# Patient Record
Sex: MALE | Race: WHITE | Employment: OTHER | URBAN - NONMETROPOLITAN AREA
[De-identification: names, ages, dates, MRNs, and addresses within clinical notes are randomized per-mention and may not be internally consistent; named-entity substitution may affect disease eponyms.]

---

## 2017-05-30 ENCOUNTER — HOSPITAL ENCOUNTER (OUTPATIENT)
Dept: CT IMAGING | Facility: MEDICAL CENTER | Age: 82
Discharge: HOME | End: 2017-05-30
Attending: INTERNAL MEDICINE | Admitting: INTERNAL MEDICINE

## 2017-05-30 DIAGNOSIS — Z85.820 HISTORY OF MELANOMA: ICD-10-CM

## 2017-05-30 DIAGNOSIS — Z85.820 PERSONAL HISTORY OF MALIGNANT MELANOMA OF SKIN: ICD-10-CM

## 2017-05-30 LAB
A/G RATIO: 1.5 (ref 1.1–2.2)
ALBUMIN SERPL-MCNC: 3.7 G/DL (ref 3.4–5)
ALP BLD-CCNC: 120 U/L (ref 40–129)
ALT SERPL-CCNC: 10 U/L (ref 10–40)
ANION GAP SERPL CALCULATED.3IONS-SCNC: 11 MMOL/L (ref 3–16)
AST SERPL-CCNC: 16 U/L (ref 15–37)
BILIRUB SERPL-MCNC: 0.4 MG/DL (ref 0–1)
BUN BLDV-MCNC: 22 MG/DL (ref 7–20)
CALCIUM SERPL-MCNC: 9.3 MG/DL (ref 8.3–10.6)
CHLORIDE BLD-SCNC: 99 MMOL/L (ref 99–110)
CO2: 28 MMOL/L (ref 21–32)
CREAT SERPL-MCNC: 1 MG/DL (ref 0.8–1.3)
GFR AFRICAN AMERICAN: >60
GFR NON-AFRICAN AMERICAN: >60
GLOBULIN: 2.4 G/DL
GLUCOSE BLD-MCNC: 105 MG/DL (ref 70–99)
POTASSIUM SERPL-SCNC: 4.5 MMOL/L (ref 3.5–5.1)
SODIUM BLD-SCNC: 138 MMOL/L (ref 136–145)
TOTAL PROTEIN: 6.1 G/DL (ref 6.4–8.2)

## 2017-12-27 PROBLEM — I50.43 CHF (CONGESTIVE HEART FAILURE), NYHA CLASS I, ACUTE ON CHRONIC, COMBINED (HCC): Status: ACTIVE | Noted: 2017-12-27

## 2018-03-02 PROBLEM — J44.1 COPD EXACERBATION (HCC): Status: ACTIVE | Noted: 2018-03-02

## 2018-03-02 PROBLEM — J20.9 ACUTE BRONCHITIS: Status: ACTIVE | Noted: 2018-03-02

## 2018-03-03 PROBLEM — E44.0 MODERATE PROTEIN-CALORIE MALNUTRITION (HCC): Status: ACTIVE | Noted: 2018-03-03

## 2018-12-03 ENCOUNTER — TELEPHONE (OUTPATIENT)
Dept: PULMONOLOGY | Age: 83
End: 2018-12-03

## 2018-12-03 DIAGNOSIS — J44.9 CHRONIC OBSTRUCTIVE PULMONARY DISEASE, UNSPECIFIED COPD TYPE (HCC): Primary | ICD-10-CM

## 2019-02-07 ENCOUNTER — OFFICE VISIT (OUTPATIENT)
Dept: PULMONOLOGY | Age: 84
End: 2019-02-07
Payer: MEDICARE

## 2019-02-07 ENCOUNTER — HOSPITAL ENCOUNTER (OUTPATIENT)
Dept: PULMONOLOGY | Age: 84
Discharge: HOME | End: 2019-02-07
Payer: MEDICARE

## 2019-02-07 VITALS
WEIGHT: 162 LBS | RESPIRATION RATE: 20 BRPM | BODY MASS INDEX: 26.03 KG/M2 | HEART RATE: 78 BPM | TEMPERATURE: 97.7 F | OXYGEN SATURATION: 92 % | SYSTOLIC BLOOD PRESSURE: 110 MMHG | DIASTOLIC BLOOD PRESSURE: 70 MMHG | HEIGHT: 66 IN

## 2019-02-07 DIAGNOSIS — I50.43 CHF (CONGESTIVE HEART FAILURE), NYHA CLASS I, ACUTE ON CHRONIC, COMBINED (HCC): ICD-10-CM

## 2019-02-07 DIAGNOSIS — J44.9 COPD, MODERATE (HCC): Primary | ICD-10-CM

## 2019-02-07 DIAGNOSIS — R91.1 PULMONARY NODULE: ICD-10-CM

## 2019-02-07 PROCEDURE — 4040F PNEUMOC VAC/ADMIN/RCVD: CPT | Performed by: INTERNAL MEDICINE

## 2019-02-07 PROCEDURE — 94060 EVALUATION OF WHEEZING: CPT

## 2019-02-07 PROCEDURE — 4004F PT TOBACCO SCREEN RCVD TLK: CPT | Performed by: INTERNAL MEDICINE

## 2019-02-07 PROCEDURE — 94664 DEMO&/EVAL PT USE INHALER: CPT

## 2019-02-07 PROCEDURE — G8926 SPIRO NO PERF OR DOC: HCPCS | Performed by: INTERNAL MEDICINE

## 2019-02-07 PROCEDURE — 1123F ACP DISCUSS/DSCN MKR DOCD: CPT | Performed by: INTERNAL MEDICINE

## 2019-02-07 PROCEDURE — G8484 FLU IMMUNIZE NO ADMIN: HCPCS | Performed by: INTERNAL MEDICINE

## 2019-02-07 PROCEDURE — 6360000002 HC RX W HCPCS: Performed by: INTERNAL MEDICINE

## 2019-02-07 PROCEDURE — G8419 CALC BMI OUT NRM PARAM NOF/U: HCPCS | Performed by: INTERNAL MEDICINE

## 2019-02-07 PROCEDURE — 94640 AIRWAY INHALATION TREATMENT: CPT

## 2019-02-07 PROCEDURE — 94729 DIFFUSING CAPACITY: CPT

## 2019-02-07 PROCEDURE — G8427 DOCREV CUR MEDS BY ELIG CLIN: HCPCS | Performed by: INTERNAL MEDICINE

## 2019-02-07 PROCEDURE — 94726 PLETHYSMOGRAPHY LUNG VOLUMES: CPT

## 2019-02-07 PROCEDURE — 1101F PT FALLS ASSESS-DOCD LE1/YR: CPT | Performed by: INTERNAL MEDICINE

## 2019-02-07 PROCEDURE — 99205 OFFICE O/P NEW HI 60 MIN: CPT | Performed by: INTERNAL MEDICINE

## 2019-02-07 PROCEDURE — 3023F SPIROM DOC REV: CPT | Performed by: INTERNAL MEDICINE

## 2019-02-07 PROCEDURE — 94618 PULMONARY STRESS TESTING: CPT

## 2019-02-07 RX ORDER — ALBUTEROL SULFATE 2.5 MG/3ML
2.5 SOLUTION RESPIRATORY (INHALATION) ONCE
Status: COMPLETED | OUTPATIENT
Start: 2019-02-07 | End: 2019-02-07

## 2019-02-07 RX ADMIN — ALBUTEROL SULFATE 2.5 MG: 2.5 SOLUTION RESPIRATORY (INHALATION) at 11:15

## 2019-05-14 ENCOUNTER — OFFICE VISIT (OUTPATIENT)
Dept: PULMONOLOGY | Age: 84
End: 2019-05-14
Payer: MEDICARE

## 2019-05-14 VITALS
OXYGEN SATURATION: 96 % | HEIGHT: 66 IN | WEIGHT: 167.8 LBS | TEMPERATURE: 97.4 F | BODY MASS INDEX: 26.97 KG/M2 | SYSTOLIC BLOOD PRESSURE: 106 MMHG | HEART RATE: 63 BPM | DIASTOLIC BLOOD PRESSURE: 63 MMHG | RESPIRATION RATE: 16 BRPM

## 2019-05-14 DIAGNOSIS — J44.9 COPD, MODERATE (HCC): Primary | ICD-10-CM

## 2019-05-14 DIAGNOSIS — Z72.0 TOBACCO ABUSE: ICD-10-CM

## 2019-05-14 PROCEDURE — 4040F PNEUMOC VAC/ADMIN/RCVD: CPT | Performed by: INTERNAL MEDICINE

## 2019-05-14 PROCEDURE — G8419 CALC BMI OUT NRM PARAM NOF/U: HCPCS | Performed by: INTERNAL MEDICINE

## 2019-05-14 PROCEDURE — 1123F ACP DISCUSS/DSCN MKR DOCD: CPT | Performed by: INTERNAL MEDICINE

## 2019-05-14 PROCEDURE — G8427 DOCREV CUR MEDS BY ELIG CLIN: HCPCS | Performed by: INTERNAL MEDICINE

## 2019-05-14 PROCEDURE — 99213 OFFICE O/P EST LOW 20 MIN: CPT | Performed by: INTERNAL MEDICINE

## 2019-05-14 PROCEDURE — 4004F PT TOBACCO SCREEN RCVD TLK: CPT | Performed by: INTERNAL MEDICINE

## 2019-05-14 PROCEDURE — 3023F SPIROM DOC REV: CPT | Performed by: INTERNAL MEDICINE

## 2019-05-14 PROCEDURE — G8926 SPIRO NO PERF OR DOC: HCPCS | Performed by: INTERNAL MEDICINE

## 2019-05-14 RX ORDER — ALBUTEROL SULFATE 90 UG/1
2 AEROSOL, METERED RESPIRATORY (INHALATION) EVERY 6 HOURS PRN
COMMUNITY
End: 2020-01-01

## 2019-05-14 NOTE — PROGRESS NOTES
[facility]    Outpatient Follow Up Note    Chief Complaint: COPD  Consulting provider: Violeta Rodriguez PA-C    Interval History: 81 y.o. male Using Spiriva which has helped. Uses his rescue inhaler about 2x/day. He wheezes intermittently. Cough with white sputum. Both worse in the am    Initial HPI:Patient is not aware of having a lung nodule. The patient does not have any record of a lung nodule that is available today. He did have a clear chest x-ray November. The patient has a history of COPD and is a smoker. The patient does not have SOB at rest but has significant MCLEOD. Exercise tolerance on level ground is household or 1 block. The patient denies a cough. The patient does wheeze intermittently.     The patient had smoked 2 PPD for many years. Started as a teenager.  Cut back to 1/4 some years ago      Current Outpatient Medications:     albuterol sulfate HFA (VENTOLIN HFA) 108 (90 Base) MCG/ACT inhaler, Inhale 2 puffs into the lungs every 6 hours as needed for Wheezing, Disp: , Rfl:     Umeclidinium Bromide 62.5 MCG/INH AEPB, 1 inhalation daily, Disp: 1 each, Rfl: 5    buPROPion (WELLBUTRIN XL) 150 MG extended release tablet, Take 1 tablet by mouth every morning, Disp: 30 tablet, Rfl: 0    ALPRAZolam (XANAX) 0.25 MG tablet, Take 0.25 mg by mouth daily as needed for Sleep ., Disp: , Rfl:     polyethylene glycol (GLYCOLAX) powder, Take 17 g by mouth daily, Disp: , Rfl:     isosorbide mononitrate (IMDUR) 30 MG extended release tablet, , Disp: , Rfl:     Aspirin Buf,YiRql-ZcCrv-HbIex, (BUFFERED ASPIRIN) 325 MG TABS, Take 325 mg by mouth, Disp: , Rfl:     losartan (COZAAR) 50 MG tablet, Take 100 mg by mouth daily , Disp: , Rfl:     tamsulosin (FLOMAX) 0.4 MG capsule, Take 0.4 mg by mouth daily , Disp: , Rfl:     carvedilol (COREG) 6.25 MG tablet, Take 6.25 mg by mouth nightly , Disp: , Rfl:     gabapentin (NEURONTIN) 400 MG capsule, 3 times daily , Disp: , Rfl:    busPIRone (BUSPAR) 10 MG tablet, Take 15 mg by mouth 2 times daily , Disp: , Rfl:     spironolactone-hydrochlorothiazide (ALDACTAZIDE) 25-25 MG per tablet, Take 0.5 tablets by mouth daily, Disp: , Rfl:     omeprazole (PRILOSEC) 20 MG capsule, Take 20 mg by mouth daily, Disp: , Rfl:     amiodarone (CORDARONE) 200 MG tablet, Take 100 mg by mouth daily, Disp: , Rfl:     magnesium oxide (MAG-OX) 400 MG tablet, Take 400 mg by mouth daily, Disp: , Rfl:     Objective:   PHYSICAL EXAM: /63   Pulse 63   Temp 97.4 °F (36.3 °C) (Oral)   Resp 16   Ht 5' 6\" (1.676 m)   Wt 167 lb 12.8 oz (76.1 kg)   SpO2 96% Comment: RA  BMI 27.08 kg/m²    Constitutional:  No acute distress. Eyes: PERRL. Conjunctivae anicteric. ENT: Normal nose. Normal tongue. Oropharynx clear. Neck:  Trachea is midline. No thyroid tenderness. Respiratory: No accessory muscle usage. No wheezes. No rales. No Rhonchi. Cardiovascular: Normal S1S2. No digit clubbing. No digit cyanosis. No LE edema. Psychiatric: No anxiety or Agitation. Alert and Oriented to person, place and time. LABS:  Reviewed any pertinent new labs that are available.     PFTs 2/7/19  FVC  (%) FEV1  (60%) FEV1/FVC ratio 0.60   TLC  (142%)  RV  (228%)   DLCO (70%) Bronchodilator response: No     6MWT:       IMAGING:  I personally reviewed and interpreted the following imaging today in the office:   CXR: 11/17/18 at [facility]: hyperinflation, clear lungs   CXR 2/7/19 [facility]: clear lungs    ASSESSMENT:  · Moderate COPD  · Tobacco abuse  · CHF     PLAN:   ·  Incruse  · Continue PRN albuterol  · Pulm rehab declined due to distance  · Tobacco cessation discussed - has dec to 1/5 PPD

## 2019-05-14 NOTE — LETTER
[facility], Critical Care, and Sleep  [address], [address] [place] [address]  Phone: [phone]  Fax: [phone]    Hiwot Casey MD        May 14, 2019     Patient: Mariola Betancourt   YOB: 1935   Date of Visit: 5/14/2019       To Whom it May Concern:    Elli Barrett was seen in my clinic on 5/14/2019. If you have any questions or concerns, please don't hesitate to call.     Sincerely,         Hiwot Casey MD

## 2019-12-16 ENCOUNTER — TELEPHONE (OUTPATIENT)
Dept: PULMONOLOGY | Age: 84
End: 2019-12-16

## 2019-12-16 NOTE — TELEPHONE ENCOUNTER
Dr Sunny Marshall [phone] called from IP unit at [facility]. Spoke with Dr Jolly Rachel. Pt has pulm nodules concerning for cancer. Pt is being d/c today. Pt is scheduled for PET CT 12/17/19. Pt will need f/u this week with Dr Dina Rivera.

## 2019-12-31 NOTE — TELEPHONE ENCOUNTER
Called spoke with Beatriz Kramer his friend on [facility]IP[facility] states he was discharged 12/24/19 from [facility] is suzanne for appt for [facility] oncology 1/13/20 and Josh Blocker 1/28/20. All testing is in careeverywhere.

## 2020-01-01 ENCOUNTER — APPOINTMENT (OUTPATIENT)
Dept: GENERAL RADIOLOGY | Age: 85
End: 2020-01-01
Payer: MEDICARE

## 2020-01-01 ENCOUNTER — APPOINTMENT (OUTPATIENT)
Dept: CT IMAGING | Age: 85
End: 2020-01-01
Payer: MEDICARE

## 2020-01-01 ENCOUNTER — HOSPITAL ENCOUNTER (EMERGENCY)
Age: 85
Discharge: ANOTHER HOSPITAL | End: 2020-06-26
Attending: EMERGENCY MEDICINE
Payer: MEDICARE

## 2020-01-01 ENCOUNTER — TELEPHONE (OUTPATIENT)
Dept: PULMONOLOGY | Age: 85
End: 2020-01-01

## 2020-01-01 ENCOUNTER — HOSPITAL ENCOUNTER (EMERGENCY)
Age: 85
Discharge: HOME | End: 2020-11-14
Attending: EMERGENCY MEDICINE
Payer: MEDICARE

## 2020-01-01 ENCOUNTER — CARE COORDINATION (OUTPATIENT)
Dept: CARE COORDINATION | Age: 85
End: 2020-01-01

## 2020-01-01 ENCOUNTER — HOSPITAL ENCOUNTER (EMERGENCY)
Age: 85
Discharge: HOME | End: 2020-09-30
Attending: EMERGENCY MEDICINE
Payer: MEDICARE

## 2020-01-01 ENCOUNTER — HOSPITAL ENCOUNTER (OUTPATIENT)
Dept: CT IMAGING | Age: 85
Discharge: HOME | End: 2020-07-07
Payer: MEDICARE

## 2020-01-01 ENCOUNTER — HOSPITAL ENCOUNTER (EMERGENCY)
Age: 85
Discharge: HOME | End: 2020-11-30
Attending: EMERGENCY MEDICINE
Payer: MEDICARE

## 2020-01-01 ENCOUNTER — VIRTUAL VISIT (OUTPATIENT)
Dept: PULMONOLOGY | Age: 85
End: 2020-01-01
Payer: MEDICARE

## 2020-01-01 ENCOUNTER — HOSPITAL ENCOUNTER (EMERGENCY)
Age: 85
Discharge: HOME | End: 2020-05-12
Payer: MEDICARE

## 2020-01-01 VITALS
SYSTOLIC BLOOD PRESSURE: 145 MMHG | HEART RATE: 84 BPM | OXYGEN SATURATION: 96 % | BODY MASS INDEX: 26.52 KG/M2 | HEIGHT: 66 IN | RESPIRATION RATE: 18 BRPM | WEIGHT: 165 LBS | DIASTOLIC BLOOD PRESSURE: 72 MMHG | TEMPERATURE: 98.4 F

## 2020-01-01 VITALS
DIASTOLIC BLOOD PRESSURE: 80 MMHG | SYSTOLIC BLOOD PRESSURE: 160 MMHG | WEIGHT: 165 LBS | TEMPERATURE: 98.6 F | HEART RATE: 72 BPM | RESPIRATION RATE: 16 BRPM | BODY MASS INDEX: 26.52 KG/M2 | HEIGHT: 66 IN | OXYGEN SATURATION: 98 %

## 2020-01-01 VITALS
OXYGEN SATURATION: 93 % | TEMPERATURE: 98 F | HEART RATE: 75 BPM | DIASTOLIC BLOOD PRESSURE: 91 MMHG | RESPIRATION RATE: 18 BRPM | SYSTOLIC BLOOD PRESSURE: 177 MMHG

## 2020-01-01 VITALS
HEIGHT: 66 IN | OXYGEN SATURATION: 99 % | HEART RATE: 73 BPM | BODY MASS INDEX: 26.68 KG/M2 | RESPIRATION RATE: 16 BRPM | WEIGHT: 166 LBS | TEMPERATURE: 97.8 F | SYSTOLIC BLOOD PRESSURE: 149 MMHG | DIASTOLIC BLOOD PRESSURE: 83 MMHG

## 2020-01-01 VITALS
WEIGHT: 166 LBS | HEART RATE: 63 BPM | OXYGEN SATURATION: 100 % | BODY MASS INDEX: 26.79 KG/M2 | SYSTOLIC BLOOD PRESSURE: 172 MMHG | TEMPERATURE: 98.7 F | RESPIRATION RATE: 21 BRPM | DIASTOLIC BLOOD PRESSURE: 84 MMHG

## 2020-01-01 LAB
A/G RATIO: 1.2 (ref 1.1–2.2)
A/G RATIO: 1.2 (ref 1.1–2.2)
A/G RATIO: 1.3 (ref 1.1–2.2)
A/G RATIO: 1.5 (ref 1.1–2.2)
ALBUMIN SERPL-MCNC: 2.9 G/DL (ref 3.4–5)
ALBUMIN SERPL-MCNC: 3 G/DL (ref 3.4–5)
ALBUMIN SERPL-MCNC: 3.1 G/DL (ref 3.4–5)
ALBUMIN SERPL-MCNC: 3.6 G/DL (ref 3.4–5)
ALP BLD-CCNC: 140 U/L (ref 40–129)
ALP BLD-CCNC: 142 U/L (ref 40–129)
ALP BLD-CCNC: 155 U/L (ref 40–129)
ALP BLD-CCNC: 92 U/L (ref 40–129)
ALT SERPL-CCNC: 7 U/L (ref 10–40)
ALT SERPL-CCNC: 7 U/L (ref 10–40)
ALT SERPL-CCNC: 8 U/L (ref 10–40)
ALT SERPL-CCNC: <5 U/L (ref 10–40)
ANION GAP SERPL CALCULATED.3IONS-SCNC: 13 MMOL/L (ref 3–16)
ANION GAP SERPL CALCULATED.3IONS-SCNC: 6 MMOL/L (ref 3–16)
ANION GAP SERPL CALCULATED.3IONS-SCNC: 8 MMOL/L (ref 3–16)
ANION GAP SERPL CALCULATED.3IONS-SCNC: 9 MMOL/L (ref 3–16)
ANISOCYTOSIS: ABNORMAL
APTT: 25.3 SEC (ref 24.2–36.2)
AST SERPL-CCNC: 11 U/L (ref 15–37)
AST SERPL-CCNC: 12 U/L (ref 15–37)
AST SERPL-CCNC: 13 U/L (ref 15–37)
AST SERPL-CCNC: 13 U/L (ref 15–37)
BACTERIA: ABNORMAL /HPF
BASOPHILS ABSOLUTE: 0 K/UL (ref 0–0.2)
BASOPHILS RELATIVE PERCENT: 0 %
BASOPHILS RELATIVE PERCENT: 0.4 %
BASOPHILS RELATIVE PERCENT: 0.5 %
BASOPHILS RELATIVE PERCENT: 0.8 %
BILIRUB SERPL-MCNC: 0.3 MG/DL (ref 0–1)
BILIRUB SERPL-MCNC: 0.4 MG/DL (ref 0–1)
BILIRUB SERPL-MCNC: 0.4 MG/DL (ref 0–1)
BILIRUB SERPL-MCNC: 0.6 MG/DL (ref 0–1)
BILIRUBIN URINE: ABNORMAL
BILIRUBIN URINE: NEGATIVE
BLOOD CULTURE, ROUTINE: NORMAL
BLOOD, URINE: ABNORMAL
BLOOD, URINE: NEGATIVE
BUN BLDV-MCNC: 20 MG/DL (ref 7–20)
BUN BLDV-MCNC: 26 MG/DL (ref 7–20)
BUN BLDV-MCNC: 33 MG/DL (ref 7–20)
BUN BLDV-MCNC: 37 MG/DL (ref 7–20)
CALCIUM SERPL-MCNC: 8.2 MG/DL (ref 8.3–10.6)
CALCIUM SERPL-MCNC: 8.3 MG/DL (ref 8.3–10.6)
CALCIUM SERPL-MCNC: 8.4 MG/DL (ref 8.3–10.6)
CALCIUM SERPL-MCNC: 8.8 MG/DL (ref 8.3–10.6)
CHLORIDE BLD-SCNC: 100 MMOL/L (ref 99–110)
CHLORIDE BLD-SCNC: 101 MMOL/L (ref 99–110)
CLARITY: ABNORMAL
CLARITY: CLEAR
CLARITY: CLEAR
CO2: 26 MMOL/L (ref 21–32)
CO2: 27 MMOL/L (ref 21–32)
CO2: 31 MMOL/L (ref 21–32)
CO2: 32 MMOL/L (ref 21–32)
COLOR: ABNORMAL
COLOR: YELLOW
CREAT SERPL-MCNC: 0.9 MG/DL (ref 0.8–1.3)
CREAT SERPL-MCNC: 1.1 MG/DL (ref 0.8–1.3)
CREAT SERPL-MCNC: 1.4 MG/DL (ref 0.8–1.3)
CREAT SERPL-MCNC: 1.4 MG/DL (ref 0.8–1.3)
CULTURE, BLOOD 2: NORMAL
EKG ATRIAL RATE: 64 BPM
EKG ATRIAL RATE: 71 BPM
EKG ATRIAL RATE: 72 BPM
EKG DIAGNOSIS: NORMAL
EKG P AXIS: 30 DEGREES
EKG P AXIS: 78 DEGREES
EKG P AXIS: 88 DEGREES
EKG P-R INTERVAL: 150 MS
EKG P-R INTERVAL: 176 MS
EKG P-R INTERVAL: 184 MS
EKG Q-T INTERVAL: 406 MS
EKG Q-T INTERVAL: 424 MS
EKG Q-T INTERVAL: 424 MS
EKG QRS DURATION: 90 MS
EKG QRS DURATION: 92 MS
EKG QRS DURATION: 94 MS
EKG QTC CALCULATION (BAZETT): 437 MS
EKG QTC CALCULATION (BAZETT): 444 MS
EKG QTC CALCULATION (BAZETT): 460 MS
EKG R AXIS: 55 DEGREES
EKG R AXIS: 65 DEGREES
EKG R AXIS: 83 DEGREES
EKG T AXIS: 30 DEGREES
EKG T AXIS: 52 DEGREES
EKG T AXIS: 82 DEGREES
EKG VENTRICULAR RATE: 64 BPM
EKG VENTRICULAR RATE: 71 BPM
EKG VENTRICULAR RATE: 72 BPM
EOSINOPHILS ABSOLUTE: 0 K/UL (ref 0–0.6)
EOSINOPHILS ABSOLUTE: 0.1 K/UL (ref 0–0.6)
EOSINOPHILS RELATIVE PERCENT: 0.3 %
EOSINOPHILS RELATIVE PERCENT: 0.8 %
EOSINOPHILS RELATIVE PERCENT: 1 %
EOSINOPHILS RELATIVE PERCENT: 1.1 %
EPITHELIAL CELLS, UA: ABNORMAL /HPF (ref 0–5)
EPITHELIAL CELLS, UA: ABNORMAL /HPF (ref 0–5)
ETHANOL: NORMAL MG/DL (ref 0–0.08)
GFR AFRICAN AMERICAN: 58
GFR AFRICAN AMERICAN: 58
GFR AFRICAN AMERICAN: >60
GFR AFRICAN AMERICAN: >60
GFR NON-AFRICAN AMERICAN: 48
GFR NON-AFRICAN AMERICAN: 48
GFR NON-AFRICAN AMERICAN: >60
GFR NON-AFRICAN AMERICAN: >60
GLOBULIN: 2.3 G/DL
GLOBULIN: 2.4 G/DL
GLOBULIN: 2.5 G/DL
GLOBULIN: 2.5 G/DL
GLUCOSE BLD-MCNC: 140 MG/DL (ref 70–99)
GLUCOSE BLD-MCNC: 95 MG/DL (ref 70–99)
GLUCOSE BLD-MCNC: 96 MG/DL (ref 70–99)
GLUCOSE BLD-MCNC: 97 MG/DL (ref 70–99)
GLUCOSE URINE: NEGATIVE MG/DL
HCT VFR BLD CALC: 27.2 % (ref 40.5–52.5)
HCT VFR BLD CALC: 28.3 % (ref 40.5–52.5)
HCT VFR BLD CALC: 32.1 % (ref 40.5–52.5)
HCT VFR BLD CALC: 35.3 % (ref 40.5–52.5)
HEMOGLOBIN: 10.7 G/DL (ref 13.5–17.5)
HEMOGLOBIN: 11.7 G/DL (ref 13.5–17.5)
HEMOGLOBIN: 9.4 G/DL (ref 13.5–17.5)
HEMOGLOBIN: 9.6 G/DL (ref 13.5–17.5)
INR BLD: 1.11 (ref 0.86–1.14)
KETONES, URINE: ABNORMAL MG/DL
KETONES, URINE: NEGATIVE MG/DL
KETONES, URINE: NEGATIVE MG/DL
LACTIC ACID: 1 MMOL/L (ref 0.4–2)
LACTIC ACID: 1.9 MMOL/L (ref 0.4–2)
LEUKOCYTE ESTERASE, URINE: ABNORMAL
LYMPHOCYTES ABSOLUTE: 0.2 K/UL (ref 1–5.1)
LYMPHOCYTES ABSOLUTE: 0.4 K/UL (ref 1–5.1)
LYMPHOCYTES ABSOLUTE: 0.6 K/UL (ref 1–5.1)
LYMPHOCYTES ABSOLUTE: 0.7 K/UL (ref 1–5.1)
LYMPHOCYTES RELATIVE PERCENT: 11.5 %
LYMPHOCYTES RELATIVE PERCENT: 5 %
LYMPHOCYTES RELATIVE PERCENT: 7.8 %
LYMPHOCYTES RELATIVE PERCENT: 9.7 %
MCH RBC QN AUTO: 31.1 PG (ref 26–34)
MCH RBC QN AUTO: 31.2 PG (ref 26–34)
MCH RBC QN AUTO: 32 PG (ref 26–34)
MCH RBC QN AUTO: 32 PG (ref 26–34)
MCHC RBC AUTO-ENTMCNC: 33.2 G/DL (ref 31–36)
MCHC RBC AUTO-ENTMCNC: 33.2 G/DL (ref 31–36)
MCHC RBC AUTO-ENTMCNC: 33.8 G/DL (ref 31–36)
MCHC RBC AUTO-ENTMCNC: 34.5 G/DL (ref 31–36)
MCV RBC AUTO: 92.2 FL (ref 80–100)
MCV RBC AUTO: 92.9 FL (ref 80–100)
MCV RBC AUTO: 93.8 FL (ref 80–100)
MCV RBC AUTO: 96.4 FL (ref 80–100)
MICROSCOPIC EXAMINATION: YES
MONOCYTES ABSOLUTE: 0.3 K/UL (ref 0–1.3)
MONOCYTES ABSOLUTE: 0.4 K/UL (ref 0–1.3)
MONOCYTES RELATIVE PERCENT: 6 %
MONOCYTES RELATIVE PERCENT: 8 %
MONOCYTES RELATIVE PERCENT: 8 %
MONOCYTES RELATIVE PERCENT: 9.5 %
MUCUS: ABNORMAL /LPF
NEUTROPHILS ABSOLUTE: 3.7 K/UL (ref 1.7–7.7)
NEUTROPHILS ABSOLUTE: 3.8 K/UL (ref 1.7–7.7)
NEUTROPHILS ABSOLUTE: 4 K/UL (ref 1.7–7.7)
NEUTROPHILS ABSOLUTE: 5.6 K/UL (ref 1.7–7.7)
NEUTROPHILS RELATIVE PERCENT: 79.8 %
NEUTROPHILS RELATIVE PERCENT: 81.1 %
NEUTROPHILS RELATIVE PERCENT: 82.7 %
NEUTROPHILS RELATIVE PERCENT: 86 %
NITRITE, URINE: NEGATIVE
NITRITE, URINE: POSITIVE
OCCULT BLOOD DIAGNOSTIC: NORMAL
ORGANISM: ABNORMAL
PDW BLD-RTO: 14.1 % (ref 12.4–15.4)
PDW BLD-RTO: 14.8 % (ref 12.4–15.4)
PDW BLD-RTO: 15.6 % (ref 12.4–15.4)
PDW BLD-RTO: 16.5 % (ref 12.4–15.4)
PH UA: 5.5 (ref 5–8)
PH UA: 6 (ref 5–8)
PH UA: 7.5 (ref 5–8)
PH UA: 7.5 (ref 5–8)
PH UA: 8 (ref 5–8)
PLATELET # BLD: 127 K/UL (ref 135–450)
PLATELET # BLD: 141 K/UL (ref 135–450)
PLATELET # BLD: 145 K/UL (ref 135–450)
PLATELET # BLD: 165 K/UL (ref 135–450)
PLATELET SLIDE REVIEW: ADEQUATE
PMV BLD AUTO: 7.7 FL (ref 5–10.5)
PMV BLD AUTO: 7.8 FL (ref 5–10.5)
PMV BLD AUTO: 8.2 FL (ref 5–10.5)
PMV BLD AUTO: 8.4 FL (ref 5–10.5)
POTASSIUM REFLEX MAGNESIUM: 4 MMOL/L (ref 3.5–5.1)
POTASSIUM REFLEX MAGNESIUM: 4 MMOL/L (ref 3.5–5.1)
POTASSIUM REFLEX MAGNESIUM: 4.1 MMOL/L (ref 3.5–5.1)
POTASSIUM REFLEX MAGNESIUM: 4.2 MMOL/L (ref 3.5–5.1)
PRO-BNP: 847 PG/ML (ref 0–449)
PROTEIN UA: 100 MG/DL
PROTEIN UA: 30 MG/DL
PROTEIN UA: ABNORMAL MG/DL
PROTEIN UA: ABNORMAL MG/DL
PROTEIN UA: NEGATIVE MG/DL
PROTHROMBIN TIME: 12.8 SEC (ref 10–13.2)
RBC # BLD: 2.92 M/UL (ref 4.2–5.9)
RBC # BLD: 3.06 M/UL (ref 4.2–5.9)
RBC # BLD: 3.33 M/UL (ref 4.2–5.9)
RBC # BLD: 3.76 M/UL (ref 4.2–5.9)
RBC UA: ABNORMAL /HPF (ref 0–4)
SLIDE REVIEW: ABNORMAL
SODIUM BLD-SCNC: 137 MMOL/L (ref 136–145)
SODIUM BLD-SCNC: 137 MMOL/L (ref 136–145)
SODIUM BLD-SCNC: 140 MMOL/L (ref 136–145)
SODIUM BLD-SCNC: 141 MMOL/L (ref 136–145)
SPECIFIC GRAVITY UA: 1.01 (ref 1–1.03)
SPECIFIC GRAVITY UA: 1.02 (ref 1–1.03)
TOTAL PROTEIN: 5.2 G/DL (ref 6.4–8.2)
TOTAL PROTEIN: 5.5 G/DL (ref 6.4–8.2)
TOTAL PROTEIN: 5.6 G/DL (ref 6.4–8.2)
TOTAL PROTEIN: 6 G/DL (ref 6.4–8.2)
TROPONIN: <0.01 NG/ML
URINE CULTURE, ROUTINE: ABNORMAL
URINE CULTURE, ROUTINE: NORMAL
URINE CULTURE, ROUTINE: NORMAL
URINE REFLEX TO CULTURE: YES
URINE TYPE: ABNORMAL
UROBILINOGEN, URINE: 0.2 E.U./DL
UROBILINOGEN, URINE: 1 E.U./DL
UROBILINOGEN, URINE: 2 E.U./DL
WBC # BLD: 4.3 K/UL (ref 4–11)
WBC # BLD: 4.6 K/UL (ref 4–11)
WBC # BLD: 5 K/UL (ref 4–11)
WBC # BLD: 6.8 K/UL (ref 4–11)
WBC UA: ABNORMAL /HPF (ref 0–5)

## 2020-01-01 PROCEDURE — 73130 X-RAY EXAM OF HAND: CPT

## 2020-01-01 PROCEDURE — 71045 X-RAY EXAM CHEST 1 VIEW: CPT

## 2020-01-01 PROCEDURE — 93010 ELECTROCARDIOGRAM REPORT: CPT | Performed by: INTERNAL MEDICINE

## 2020-01-01 PROCEDURE — 87086 URINE CULTURE/COLONY COUNT: CPT

## 2020-01-01 PROCEDURE — 2580000003 HC RX 258: Performed by: EMERGENCY MEDICINE

## 2020-01-01 PROCEDURE — 85025 COMPLETE CBC W/AUTO DIFF WBC: CPT

## 2020-01-01 PROCEDURE — 70486 CT MAXILLOFACIAL W/O DYE: CPT

## 2020-01-01 PROCEDURE — 96365 THER/PROPH/DIAG IV INF INIT: CPT

## 2020-01-01 PROCEDURE — 72128 CT CHEST SPINE W/O DYE: CPT

## 2020-01-01 PROCEDURE — 99285 EMERGENCY DEPT VISIT HI MDM: CPT

## 2020-01-01 PROCEDURE — 81001 URINALYSIS AUTO W/SCOPE: CPT

## 2020-01-01 PROCEDURE — 71250 CT THORAX DX C-: CPT

## 2020-01-01 PROCEDURE — 99284 EMERGENCY DEPT VISIT MOD MDM: CPT

## 2020-01-01 PROCEDURE — G0480 DRUG TEST DEF 1-7 CLASSES: HCPCS

## 2020-01-01 PROCEDURE — 93005 ELECTROCARDIOGRAM TRACING: CPT | Performed by: EMERGENCY MEDICINE

## 2020-01-01 PROCEDURE — 96360 HYDRATION IV INFUSION INIT: CPT

## 2020-01-01 PROCEDURE — 87077 CULTURE AEROBIC IDENTIFY: CPT

## 2020-01-01 PROCEDURE — 70450 CT HEAD/BRAIN W/O DYE: CPT

## 2020-01-01 PROCEDURE — 90471 IMMUNIZATION ADMIN: CPT | Performed by: EMERGENCY MEDICINE

## 2020-01-01 PROCEDURE — 80053 COMPREHEN METABOLIC PANEL: CPT

## 2020-01-01 PROCEDURE — 6370000000 HC RX 637 (ALT 250 FOR IP): Performed by: PHYSICIAN ASSISTANT

## 2020-01-01 PROCEDURE — 36415 COLL VENOUS BLD VENIPUNCTURE: CPT

## 2020-01-01 PROCEDURE — 73070 X-RAY EXAM OF ELBOW: CPT

## 2020-01-01 PROCEDURE — 73502 X-RAY EXAM HIP UNI 2-3 VIEWS: CPT

## 2020-01-01 PROCEDURE — G0328 FECAL BLOOD SCRN IMMUNOASSAY: HCPCS

## 2020-01-01 PROCEDURE — 99283 EMERGENCY DEPT VISIT LOW MDM: CPT

## 2020-01-01 PROCEDURE — 6360000002 HC RX W HCPCS: Performed by: EMERGENCY MEDICINE

## 2020-01-01 PROCEDURE — 83880 ASSAY OF NATRIURETIC PEPTIDE: CPT

## 2020-01-01 PROCEDURE — 73120 X-RAY EXAM OF HAND: CPT

## 2020-01-01 PROCEDURE — 90715 TDAP VACCINE 7 YRS/> IM: CPT | Performed by: EMERGENCY MEDICINE

## 2020-01-01 PROCEDURE — 73090 X-RAY EXAM OF FOREARM: CPT

## 2020-01-01 PROCEDURE — 83605 ASSAY OF LACTIC ACID: CPT

## 2020-01-01 PROCEDURE — 93005 ELECTROCARDIOGRAM TRACING: CPT | Performed by: PHYSICIAN ASSISTANT

## 2020-01-01 PROCEDURE — 73610 X-RAY EXAM OF ANKLE: CPT

## 2020-01-01 PROCEDURE — 87186 SC STD MICRODIL/AGAR DIL: CPT

## 2020-01-01 PROCEDURE — 72170 X-RAY EXAM OF PELVIS: CPT

## 2020-01-01 PROCEDURE — 12051 INTMD RPR FACE/MM 2.5 CM/<: CPT

## 2020-01-01 PROCEDURE — 71260 CT THORAX DX C+: CPT

## 2020-01-01 PROCEDURE — 84484 ASSAY OF TROPONIN QUANT: CPT

## 2020-01-01 PROCEDURE — 87040 BLOOD CULTURE FOR BACTERIA: CPT

## 2020-01-01 PROCEDURE — 99214 OFFICE O/P EST MOD 30 MIN: CPT | Performed by: INTERNAL MEDICINE

## 2020-01-01 PROCEDURE — 96361 HYDRATE IV INFUSION ADD-ON: CPT

## 2020-01-01 PROCEDURE — 3209999900 CT THORACIC SPINE TRAUMA RECONSTRUCTION

## 2020-01-01 PROCEDURE — 3209999900 CT LUMBAR SPINE TRAUMA RECONSTRUCTION

## 2020-01-01 PROCEDURE — 73030 X-RAY EXAM OF SHOULDER: CPT

## 2020-01-01 PROCEDURE — 85730 THROMBOPLASTIN TIME PARTIAL: CPT

## 2020-01-01 PROCEDURE — 72125 CT NECK SPINE W/O DYE: CPT

## 2020-01-01 PROCEDURE — 72131 CT LUMBAR SPINE W/O DYE: CPT

## 2020-01-01 PROCEDURE — 96375 TX/PRO/DX INJ NEW DRUG ADDON: CPT

## 2020-01-01 PROCEDURE — 85610 PROTHROMBIN TIME: CPT

## 2020-01-01 PROCEDURE — 6360000004 HC RX CONTRAST MEDICATION: Performed by: EMERGENCY MEDICINE

## 2020-01-01 RX ORDER — CARVEDILOL 6.25 MG/1
3.12 TABLET ORAL NIGHTLY
Qty: 30 TABLET | Refills: 0 | Status: SHIPPED | OUTPATIENT
Start: 2020-01-01

## 2020-01-01 RX ORDER — ONDANSETRON 2 MG/ML
4 INJECTION INTRAMUSCULAR; INTRAVENOUS ONCE
Status: COMPLETED | OUTPATIENT
Start: 2020-01-01 | End: 2020-01-01

## 2020-01-01 RX ORDER — CARVEDILOL 6.25 MG/1
3.12 TABLET ORAL NIGHTLY
Qty: 60 TABLET | Refills: 0 | Status: SHIPPED | OUTPATIENT
Start: 2020-01-01 | End: 2020-01-01 | Stop reason: SDUPTHER

## 2020-01-01 RX ORDER — ASPIRIN 81 MG/1
81 TABLET ORAL DAILY
COMMUNITY

## 2020-01-01 RX ORDER — POTASSIUM CHLORIDE 750 MG/1
10 TABLET, FILM COATED, EXTENDED RELEASE ORAL DAILY
COMMUNITY

## 2020-01-01 RX ORDER — FLUTICASONE FUROATE AND VILANTEROL TRIFENATATE 100; 25 UG/1; UG/1
POWDER RESPIRATORY (INHALATION) DAILY
COMMUNITY

## 2020-01-01 RX ORDER — LEVETIRACETAM 1000 MG/1
1000 TABLET ORAL 2 TIMES DAILY
COMMUNITY

## 2020-01-01 RX ORDER — 0.9 % SODIUM CHLORIDE 0.9 %
500 INTRAVENOUS SOLUTION INTRAVENOUS ONCE
Status: COMPLETED | OUTPATIENT
Start: 2020-01-01 | End: 2020-01-01

## 2020-01-01 RX ORDER — MORPHINE SULFATE 4 MG/ML
4 INJECTION, SOLUTION INTRAMUSCULAR; INTRAVENOUS
Status: DISCONTINUED | OUTPATIENT
Start: 2020-01-01 | End: 2020-01-01 | Stop reason: HOSPADM

## 2020-01-01 RX ORDER — CEPHALEXIN 500 MG/1
500 CAPSULE ORAL ONCE
Status: COMPLETED | OUTPATIENT
Start: 2020-01-01 | End: 2020-01-01

## 2020-01-01 RX ORDER — ACETAMINOPHEN 325 MG/1
650 TABLET ORAL EVERY 6 HOURS PRN
COMMUNITY

## 2020-01-01 RX ORDER — CEFUROXIME AXETIL 250 MG/1
250 TABLET ORAL 2 TIMES DAILY
Qty: 14 TABLET | Refills: 0 | Status: SHIPPED | OUTPATIENT
Start: 2020-01-01 | End: 2020-01-01

## 2020-01-01 RX ORDER — MORPHINE SULFATE 4 MG/ML
4 INJECTION, SOLUTION INTRAMUSCULAR; INTRAVENOUS ONCE
Status: COMPLETED | OUTPATIENT
Start: 2020-01-01 | End: 2020-01-01

## 2020-01-01 RX ORDER — HYDROCHLOROTHIAZIDE 25 MG/1
12.5 TABLET ORAL EVERY MORNING
Qty: 30 TABLET | Refills: 0 | Status: SHIPPED | OUTPATIENT
Start: 2020-01-01

## 2020-01-01 RX ORDER — DOXAZOSIN MESYLATE 4 MG/1
4 TABLET ORAL NIGHTLY
COMMUNITY

## 2020-01-01 RX ORDER — 0.9 % SODIUM CHLORIDE 0.9 %
1000 INTRAVENOUS SOLUTION INTRAVENOUS ONCE
Status: COMPLETED | OUTPATIENT
Start: 2020-01-01 | End: 2020-01-01

## 2020-01-01 RX ORDER — DOCUSATE SODIUM 100 MG/1
100 CAPSULE, LIQUID FILLED ORAL 2 TIMES DAILY PRN
Qty: 60 CAPSULE | Refills: 0 | Status: SHIPPED | OUTPATIENT
Start: 2020-01-01

## 2020-01-01 RX ORDER — DIPHENHYDRAMINE HCL 25 MG
25 CAPSULE ORAL EVERY 6 HOURS PRN
COMMUNITY

## 2020-01-01 RX ORDER — FERROUS SULFATE 325(65) MG
325 TABLET ORAL
COMMUNITY

## 2020-01-01 RX ORDER — CEPHALEXIN 500 MG/1
500 CAPSULE ORAL 2 TIMES DAILY
Qty: 14 CAPSULE | Refills: 0 | Status: SHIPPED | OUTPATIENT
Start: 2020-01-01 | End: 2020-01-01

## 2020-01-01 RX ADMIN — MORPHINE SULFATE 4 MG: 4 INJECTION, SOLUTION INTRAMUSCULAR; INTRAVENOUS at 00:52

## 2020-01-01 RX ADMIN — SODIUM CHLORIDE 500 ML: 900 INJECTION, SOLUTION INTRAVENOUS at 19:48

## 2020-01-01 RX ADMIN — CEPHALEXIN 500 MG: 500 CAPSULE ORAL at 17:40

## 2020-01-01 RX ADMIN — ONDANSETRON HYDROCHLORIDE 4 MG: 2 INJECTION, SOLUTION INTRAMUSCULAR; INTRAVENOUS at 00:52

## 2020-01-01 RX ADMIN — CEFTRIAXONE SODIUM 1 G: 1 INJECTION, POWDER, FOR SOLUTION INTRAMUSCULAR; INTRAVENOUS at 07:02

## 2020-01-01 RX ADMIN — TETANUS TOXOID, REDUCED DIPHTHERIA TOXOID AND ACELLULAR PERTUSSIS VACCINE, ADSORBED 0.5 ML: 5; 2.5; 8; 8; 2.5 SUSPENSION INTRAMUSCULAR at 00:53

## 2020-01-01 RX ADMIN — IOPAMIDOL 75 ML: 755 INJECTION, SOLUTION INTRAVENOUS at 18:40

## 2020-01-01 RX ADMIN — SODIUM CHLORIDE 1000 ML: 9 INJECTION, SOLUTION INTRAVENOUS at 00:52

## 2020-01-01 RX ADMIN — SODIUM CHLORIDE 1000 ML: 9 INJECTION, SOLUTION INTRAVENOUS at 07:02

## 2020-01-01 RX ADMIN — SODIUM CHLORIDE 500 ML: 9 INJECTION, SOLUTION INTRAVENOUS at 20:27

## 2020-01-01 ASSESSMENT — PAIN DESCRIPTION - DESCRIPTORS
DESCRIPTORS: SHARP;SHOOTING
DESCRIPTORS: DISCOMFORT

## 2020-01-01 ASSESSMENT — PAIN SCALES - GENERAL
PAINLEVEL_OUTOF10: 7
PAINLEVEL_OUTOF10: 6
PAINLEVEL_OUTOF10: 7
PAINLEVEL_OUTOF10: 4
PAINLEVEL_OUTOF10: 6
PAINLEVEL_OUTOF10: 8
PAINLEVEL_OUTOF10: 8

## 2020-01-01 ASSESSMENT — ENCOUNTER SYMPTOMS
ABDOMINAL PAIN: 0
BACK PAIN: 0
VOMITING: 0
SHORTNESS OF BREATH: 0

## 2020-01-01 ASSESSMENT — PAIN DESCRIPTION - LOCATION
LOCATION: ARM;HIP
LOCATION: ABDOMEN

## 2020-01-01 ASSESSMENT — PAIN DESCRIPTION - PAIN TYPE
TYPE: ACUTE PAIN

## 2020-01-01 ASSESSMENT — PAIN DESCRIPTION - ORIENTATION: ORIENTATION: LEFT;RIGHT

## 2020-01-01 ASSESSMENT — VISUAL ACUITY: OU: 1

## 2020-01-17 NOTE — TELEPHONE ENCOUNTER
Oncology notes scanned for review. Ct chest and appt with Dr Celestino palacios 3/24/20 @ 10:30am.      Order pended.

## 2020-05-12 NOTE — ED PROVIDER NOTES
Other Topics Concern    None   Social History Narrative    None       SCREENINGS             PHYSICAL EXAM    (up to 7 for level 4, 8 or more for level 5)     ED Triage Vitals [05/12/20 1555]   BP Temp Temp Source Pulse Resp SpO2 Height Weight   (!) 179/99 98.6 °F (37 °C) Oral 67 16 99 % 5' 6\" (1.676 m) 165 lb (74.8 kg)       Physical Exam  Vitals signs and nursing note reviewed. Constitutional:       Appearance: He is well-developed. He is not toxic-appearing. HENT:      Head: Normocephalic and atraumatic. Mouth/Throat:      Mouth: Mucous membranes are moist.      Pharynx: Oropharynx is clear. Eyes:      Conjunctiva/sclera: Conjunctivae normal.   Cardiovascular:      Rate and Rhythm: Normal rate and regular rhythm. Heart sounds: Normal heart sounds. Pulmonary:      Effort: Pulmonary effort is normal. No respiratory distress. Breath sounds: Normal breath sounds. No wheezing or rales. Abdominal:      General: Bowel sounds are normal.      Palpations: Abdomen is soft. Tenderness: There is no abdominal tenderness. There is no right CVA tenderness, left CVA tenderness, guarding or rebound. Genitourinary:     Comments: Pappas catheter in place. Dark yellow urine in bag. Skin:     General: Skin is warm and dry. Neurological:      Mental Status: He is alert and oriented to person, place, and time. Motor: No abnormal muscle tone. Psychiatric:         Behavior: Behavior normal.         DIAGNOSTIC RESULTS   LABS:    Labs Reviewed   URINE RT REFLEX TO CULTURE - Abnormal; Notable for the following components:       Result Value    Clarity, UA SL CLOUDY (*)     Blood, Urine MODERATE (*)     Protein,  (*)     Leukocyte Esterase, Urine MODERATE (*)     All other components within normal limits    Narrative:     Performed at:  [facility]. [facility]  [address] [place] [address],  [place] [address].  [place], [facility]    Phone [phone]   CBC WITH AUTO DIFFERENTIAL - 10.7 (L) 13.5 - 17.5 g/dL    Hematocrit 32.1 (L) 40.5 - 52.5 %    MCV 96.4 80.0 - 100.0 fL    MCH 32.0 26.0 - 34.0 pg    MCHC 33.2 31.0 - 36.0 g/dL    RDW 16.5 (H) 12.4 - 15.4 %    Platelets 610 777 - 333 K/uL    MPV 7.7 5.0 - 10.5 fL    Neutrophils % 79.8 %    Lymphocytes % 11.5 %    Monocytes % 8.0 %    Eosinophils % 0.3 %    Basophils % 0.4 %    Neutrophils Absolute 4.0 1.7 - 7.7 K/uL    Lymphocytes Absolute 0.6 (L) 1.0 - 5.1 K/uL    Monocytes Absolute 0.4 0.0 - 1.3 K/uL    Eosinophils Absolute 0.0 0.0 - 0.6 K/uL    Basophils Absolute 0.0 0.0 - 0.2 K/uL   Comprehensive Metabolic Panel w/ Reflex to MG   Result Value Ref Range    Sodium 140 136 - 145 mmol/L    Potassium reflex Magnesium 4.0 3.5 - 5.1 mmol/L    Chloride 101 99 - 110 mmol/L    CO2 26 21 - 32 mmol/L    Anion Gap 13 3 - 16    Glucose 97 70 - 99 mg/dL    BUN 20 7 - 20 mg/dL    CREATININE 1.1 0.8 - 1.3 mg/dL    GFR Non-African American >60 >60    GFR African American >60 >60    Calcium 8.4 8.3 - 10.6 mg/dL    Total Protein 5.5 (L) 6.4 - 8.2 g/dL    Alb 3.0 (L) 3.4 - 5.0 g/dL    Albumin/Globulin Ratio 1.2 1.1 - 2.2    Total Bilirubin 0.6 0.0 - 1.0 mg/dL    Alkaline Phosphatase 155 (H) 40 - 129 U/L    ALT <5 (L) 10 - 40 U/L    AST 12 (L) 15 - 37 U/L    Globulin 2.5 g/dL   Microscopic Urinalysis   Result Value Ref Range    WBC, UA  (A) 0 - 5 /HPF    RBC, UA 11-20 (A) 0 - 4 /HPF    Epithelial Cells, UA 0-1 0 - 5 /HPF    Bacteria, UA Rare (A) None Seen /HPF         All other labs were within normal range or not returned as of this dictation. EKG: All EKG's are interpreted by the Emergency Department Physician in the absence of a cardiologist.  Please see their note for interpretation of EKG.       RADIOLOGY:   Non-plain film images such as CT, Ultrasound and MRI are read by the radiologist. Plain radiographic images are visualized andpreliminarily interpreted by the  ED Provider with the below findings:        Interpretation perthe Radiologist below, of 5/12/2020  5:42 PM                 (Please note that portions ofthis note were completed with a voice recognition program.  Efforts were made to edit the dictations but occasionally words are mis-transcribed.)    Aaron Carrera PA-C (electronically signed)            Marcelline Mohs, PA-C  05/13/20 [id number]

## 2020-05-20 NOTE — CARE COORDINATION
1 week f/u. ACM spoke with pts wife who stated \"he's doing ok. We are just waiting to see the kidney doctor (urolopgist) in . \" (6/10/2020 Urologist Dr. London Drew). Patient contacted regarding COVID-19 risk and screening. Care Transition Nurse/ Ambulatory Care Manager contacted the family by telephone to perform follow-up assessment. Verified name and  with family as identifiers. Patient has following risk factors of: HTN, Recent ED visit. Symptoms reviewed with family who verbalized the following symptoms: no worsening symptoms. Due to no new or worsening symptoms encounter was not routed to provider for escalation. Education provided regarding infection prevention, and signs and symptoms of COVID-19 and when to seek medical attention with family who verbalized understanding. Discussed exposure protocols and quarantine from [address] you at higher risk for severe illness  and given an opportunity for questions and concerns. The family agrees to contact the COVID-19 hotline [phone] or PCP office for questions related to their healthcare. CTN/ACM provided contact information for future reference. From CDC: Are you at higher risk for severe illness?  Wash your hands often.  Avoid close contact (6 feet, which is about two arm lengths) with people who are sick.  Put distance between yourself and other people if COVID-19 is spreading in your community.  Clean and disinfect frequently touched surfaces.  Avoid all cruise travel and non-essential air travel.  Call your healthcare professional if you have concerns about COVID-19 and your underlying condition or if you are sick. For more information on steps you can take to protect yourself, see CDC's How to [address] [place] [address] for follow-up call in 5-7 days based on severity of symptoms and risk factors.     Donis Ying BSN, RN Care Manager  [phone] [facility]

## 2020-05-26 NOTE — CARE COORDINATION
You Patient resolved from the Care Transitions episode on 5/26/2020 ED visit 5/12/2020)  Discussed COVID-19 related testing which was not done at this time. Test results were not done. Patient informed of results, if available? Pt/pts wife aware COVID testing was not completed    Patient/family has been provided the following resources and education related to COVID-19:                         Signs, symptoms and red flags related to COVID-19            CDC exposure and quarantine guidelines            [facility] exposure contact - [phone]            Contact for their local Department of Health               Patient currently reports that the following symptoms have improved:  no new/worsening symptoms     No further outreach scheduled with this CTN/ACM. Episode of Care resolved. Patient has this CTN/ACM contact information if future needs arise. FU appts/Provider:    Future Appointments   Date Time Provider Cheryl Powers   6/23/2020 11:30 AM [place] [facility]   6/26/2020 11:30 AM Jesus Pop MD [facility]     \"We are just waiting to see the kidney doctor (urologist) in June. \" (6/10/2020 Urologist Dr. Stefany Alonso). \"    Mindy SOSAN, RN Care Manager  [phone]  [address] [place] [address],  [address] [place] [address] Primary Care

## 2020-06-26 NOTE — ED NOTES
Resting quietly,  No distress. Declines breakfast.  Taking sips of water. Wife updated / phone aware of pending transport to hospital in [facility] where daughter lives.        Kyung Ellis RN  06/26/20 [id number]

## 2020-06-26 NOTE — TELEPHONE ENCOUNTER
Cancelled patients CT follow-up on 6/26/20 with . Patient is in ED at [facility].         ASSESSMENT:5/14/19  · Moderate COPD  · Tobacco abuse  · CHF     PLAN:   ·  Incruse  · Continue PRN albuterol  · Pulm rehab declined due to distance  · Tobacco cessation discussed - has dec to 1/5 PPD

## 2020-06-26 NOTE — ED PROVIDER NOTES
file    Number of children: Not on file    Years of education: Not on file    Highest education level: Not on file   Occupational History    Not on file   Social Needs    Financial resource strain: Not on file    Food insecurity     Worry: Not on file     Inability: Not on file    Transportation needs     Medical: Not on file     Non-medical: Not on file   Tobacco Use    Smoking status: Current Every Day Smoker     Packs/day: 0.25     Years: 61.00     Pack years: 15.25     Types: Cigarettes    Smokeless tobacco: Never Used    Tobacco comment: 5/15/19 4 cigs daily   Substance and Sexual Activity    Alcohol use: No    Drug use: No    Sexual activity: Not Currently   Lifestyle    Physical activity     Days per week: Not on file     Minutes per session: Not on file    Stress: Not on file   Relationships    Social connections     Talks on phone: Not on file     Gets together: Not on file     Attends Christian service: Not on file     Active member of club or organization: Not on file     Attends meetings of clubs or organizations: Not on file     Relationship status: Not on file    Intimate partner violence     Fear of current or ex partner: Not on file     Emotionally abused: Not on file     Physically abused: Not on file     Forced sexual activity: Not on file   Other Topics Concern    Not on file   Social History Narrative    Not on file     Current Facility-Administered Medications   Medication Dose Route Frequency Provider Last Rate Last Dose    morphine sulfate (PF) injection 4 mg  4 mg Intravenous Once PRN Corean Simmonds, DO        cefTRIAXone (ROCEPHIN) 1 g in sterile water 10 mL IV syringe  1 g Intravenous Once Odalys Simmonds, DO        0.9 % sodium chloride bolus  1,000 mL Intravenous Once Coremateo Simmonds, DO         Current Outpatient Medications   Medication Sig Dispense Refill    Umeclidinium Bromide (INCRUSE ELLIPTA) 62.5 MCG/INH AEPB INHALE 1 PUFF ONCE DAILY 1 each 5    albuterol sulfate HFA (VENTOLIN HFA) 108 (90 Base) MCG/ACT inhaler Inhale 2 puffs into the lungs every 6 hours as needed for Wheezing      buPROPion (WELLBUTRIN XL) 150 MG extended release tablet Take 1 tablet by mouth every morning 30 tablet 0    ALPRAZolam (XANAX) 0.25 MG tablet Take 0.25 mg by mouth daily as needed for Sleep .  polyethylene glycol (GLYCOLAX) powder Take 17 g by mouth daily      isosorbide mononitrate (IMDUR) 30 MG extended release tablet       Aspirin Buf,[id number]-[id number], (BUFFERED ASPIRIN) 325 MG TABS Take 325 mg by mouth      losartan (COZAAR) 50 MG tablet Take 100 mg by mouth daily       tamsulosin (FLOMAX) 0.4 MG capsule Take 0.4 mg by mouth daily       carvedilol (COREG) 6.25 MG tablet Take 6.25 mg by mouth nightly       gabapentin (NEURONTIN) 400 MG capsule 3 times daily       busPIRone (BUSPAR) 10 MG tablet Take 15 mg by mouth 2 times daily       spironolactone-hydrochlorothiazide (ALDACTAZIDE) 25-25 MG per tablet Take 0.5 tablets by mouth daily      omeprazole (PRILOSEC) 20 MG capsule Take 20 mg by mouth daily      amiodarone (CORDARONE) 200 MG tablet Take 100 mg by mouth daily      magnesium oxide (MAG-OX) 400 MG tablet Take 400 mg by mouth daily       Allergies   Allergen Reactions    No Known Allergies        REVIEW OF SYSTEMS  10 systems reviewed, pertinent positives per HPI otherwise noted to be negative. PHYSICAL EXAM  BP (!) 186/94   Pulse 82   Temp 98 °F (36.7 °C) (Tympanic)   Resp 16   SpO2 94%   GENERAL APPEARANCE: Awake and alert. Cooperative. No acute distress, but appears uncomfortable, unkept appearance  HEAD: Normocephalic. With facial trauma, see photos below, notable tenderness palpation over the left brow and zygomatic arch, no crepitus  EYES: PERRL. EOM's grossly intact.   Arcus senilis, grossly normal vision  ENT: Mucous membranes are tacky, O2 nasal cannula in place, no hemotympanum, no septal hematoma, no midface instability  NECK: Supple. No rigidity, but significant thoracic kyphosis/cervical lordosis, mild midline tenderness to palpation of entire cervical spine, no step-offs or deformity  Back: No step-offs or deformity, mild lumbar midline spinal tenderness, no thoracic spinal tenderness,  hEART: RRR. No murmurs  LUNGS: Respirations nonlabored. Lungs are clear to auscultation bilaterally. ABDOMEN: Soft. Non-distended. Non-tender. No guarding or rebound. Normal bowel sounds. Pappas catheter in place, with urine bag on leg, uncircumcised phallus, no erythema/edema or purulence  EXTREMITIES: Trace nonpitting lower extremity peripheral edema. Onychomycosis noted, right ankle with some lateral malleolar pain with palpation, some pain with range of motion, 2+ DP and PT pulses, 2+ radial pulses, distal sensation intact in all digits in upper and lower extremities, he has very minimal range of motion of the left shoulder, has pain with range of motion in the left elbow and skin tears overlying, slight edema bilateral hands and multiple skin tears on these as well, but no bony deformities with exception of notable edema, and ecchymosis over the left anterior/lateral shoulder region  SKIN: Warm and dry. See photos below of the multiple skin tears, he has multiple seborrheic keratoses, and along with other skin lesions, dried blood in multiple locations as well, this was cleaned  NEUROLOGICAL: Alert and oriented. No gross facial drooping. Strength 4/5 with exception of left upper extremity that he has limitation due to pain, sensation intact. No truncal ataxia. Normal speech  PSYCHIATRIC: Normal mood and flat affect. LABS  I have reviewed all labs for this visit.    Results for orders placed or performed during the hospital encounter of 06/26/20   CBC Auto Differential   Result Value Ref Range    WBC 6.8 4.0 - 11.0 K/uL    RBC 3.76 (L) 4.20 - 5.90 M/uL    Hemoglobin 11.7 (L) 13.5 - 17.5 g/dL    Hematocrit 35.3 (L) 40.5 - 52.5 %    MCV 93.8 80.0 - 100.0 fL    MCH 31.1 26.0 - 34.0 pg    MCHC 33.2 31.0 - 36.0 g/dL    RDW 14.1 12.4 - 15.4 %    Platelets 530 (L) 007 - 450 K/uL    MPV 8.4 5.0 - 10.5 fL    Neutrophils % 82.7 %    Lymphocytes % 9.7 %    Monocytes % 6.0 %    Eosinophils % 1.1 %    Basophils % 0.5 %    Neutrophils Absolute 5.6 1.7 - 7.7 K/uL    Lymphocytes Absolute 0.7 (L) 1.0 - 5.1 K/uL    Monocytes Absolute 0.4 0.0 - 1.3 K/uL    Eosinophils Absolute 0.1 0.0 - 0.6 K/uL    Basophils Absolute 0.0 0.0 - 0.2 K/uL   Comprehensive Metabolic Panel w/ Reflex to MG   Result Value Ref Range    Sodium 141 136 - 145 mmol/L    Potassium reflex Magnesium 4.1 3.5 - 5.1 mmol/L    Chloride 101 99 - 110 mmol/L    CO2 32 21 - 32 mmol/L    Anion Gap 8 3 - 16    Glucose 95 70 - 99 mg/dL    BUN 33 (H) 7 - 20 mg/dL    CREATININE 1.4 (H) 0.8 - 1.3 mg/dL    GFR Non- 48 (A) >60    GFR  58 (A) >60    Calcium 8.8 8.3 - 10.6 mg/dL    Total Protein 6.0 (L) 6.4 - 8.2 g/dL    Alb 3.6 3.4 - 5.0 g/dL    Albumin/Globulin Ratio 1.5 1.1 - 2.2    Total Bilirubin 0.4 0.0 - 1.0 mg/dL    Alkaline Phosphatase 142 (H) 40 - 129 U/L    ALT 7 (L) 10 - 40 U/L    AST 13 (L) 15 - 37 U/L    Globulin 2.4 g/dL   Ethanol   Result Value Ref Range    Ethanol Lvl None Detected mg/dL   Protime-INR   Result Value Ref Range    Protime 12.8 10.0 - 13.2 sec    INR 1.11 0.86 - 1.14   APTT   Result Value Ref Range    aPTT 25.3 24.2 - 36.2 sec   Urinalysis Reflex to Culture   Result Value Ref Range    Color, UA Yellow Straw/Yellow    Clarity, UA CLOUDY (A) Clear    Glucose, Ur Negative Negative mg/dL    Bilirubin Urine Negative Negative    Ketones, Urine TRACE (A) Negative mg/dL    Specific Gravity, UA 1.015 1.005 - 1.030    Blood, Urine MODERATE (A) Negative    pH, UA 7.5 5.0 - 8.0    Protein, UA 30 (A) Negative mg/dL    Urobilinogen, Urine 1.0 <2.0 E.U./dL    Nitrite, Urine Negative Negative    Leukocyte Esterase, Urine MODERATE (A) Negative Microscopic Examination YES     Urine Type NotGiven     Urine Reflex to Culture Yes    Microscopic Urinalysis   Result Value Ref Range    WBC, UA  (A) 0 - 5 /HPF    RBC, UA 0-2 0 - 4 /HPF    Bacteria, UA 4+ (A) None Seen /HPF   EKG 12 Lead   Result Value Ref Range    Ventricular Rate 64 BPM    Atrial Rate 64 BPM    P-R Interval 176 ms    QRS Duration 90 ms    Q-T Interval 424 ms    QTc Calculation (Bazett) 437 ms    P Axis 78 degrees    R Axis 55 degrees    T Axis 52 degrees    Diagnosis       Sinus rhythm with Premature atrial complexesAnteroseptal infarct , age undeterminedAbnormal ECGNo previous ECGs available       RADIOLOGY  CT CERVICAL SPINE WO CONTRAST (Preliminary result)   Result time 06/26/20 03:56:04   Preliminary result by Dalia Joya MD (06/26/20 03:56:04)                Impression:    No acute abnormality of the cervical spine.                    CT head WO contrast (Final result)   Result time 06/26/20 03:14:18   Final result by Nathaniel Hollingsworth MD (06/26/20 03:14:18)                Impression:    No acute intracranial abnormality. Narrative:    EXAMINATION:  CT OF THE HEAD WITHOUT CONTRAST  6/26/2020 12:38 am    TECHNIQUE:  CT of the head was performed without the administration of intravenous  contrast. Dose modulation, iterative reconstruction, and/or weight based  adjustment of the mA/kV was utilized to reduce the radiation dose to as low  as reasonably achievable. COMPARISON:  October 9, 2017    HISTORY:  ORDERING SYSTEM PROVIDED HISTORY: head injury, fall  TECHNOLOGIST PROVIDED HISTORY:  Reason for exam:->head injury, fall  Has a \"code stroke\" or \"stroke alert\" been called? ->No  Reason for Exam:  fall  Acuity: Acute  Type of Exam: Initial    FINDINGS:  BRAIN/VENTRICLES: No acute blood products, shift of the midline structures,  or CT evidence of acute infarct.  The ventricles and sulci are enlarged.   Moderate hypoattenuation of the periventricular white matter favoring  microvascular ischemic change. ORBITS: The visualized portion of the orbits demonstrate no acute abnormality. SINUSES: The visualized paranasal sinuses and mastoid air cells demonstrate  no acute abnormality. SOFT TISSUES/SKULL:  No acute abnormality of the visualized skull or soft  tissues.                    CT THORACIC SPINE WO CONTRAST (Preliminary result)   Result time 06/26/20 04:14:13   Preliminary result by Conor Zuniga MD (06/26/20 04:14:13)                Impression:    1. Osteopenia.  Compression deformity of the inferior endplate of L4 has  increased from 2017 and there is 50% vertebral body height loss with minimal  retropulsion into the spinal canal.  Mild compression deformity of the  superior endplate of L1 has increased from 2017.  Minimal compression  deformity of L2 appears to be new from 2017.  An MRI or bone scan could  further evaluate for acute fractures. 2. Osteopenia with multiple chronic compression deformities of the thoracic  spine. 3. Multiple noncalcified nodules throughout the visualized lungs measuring up  to 12 are new from 2017 and suspicious for metastatic disease.                    CT LUMBAR SPINE WO CONTRAST (Preliminary result)   Result time 06/26/20 04:14:13   Preliminary result by Conor Zuniga MD (06/26/20 04:14:13)                Impression:    1. Osteopenia.  Compression deformity of the inferior endplate of L4 has  increased from 2017 and there is 50% vertebral body height loss with minimal  retropulsion into the spinal canal.  Mild compression deformity of the  superior endplate of L1 has increased from 2017.  Minimal compression  deformity of L2 appears to be new from 2017.  An MRI or bone scan could  further evaluate for acute fractures. 2. Osteopenia with multiple chronic compression deformities of the thoracic  spine.   3. Multiple noncalcified nodules throughout the visualized lungs measuring up  to 12 are new from 2017 and Janna Lloyd MD (06/26/20 03:20:33)                Impression:    Degenerative changes throughout the hand. No evidence of fracture but evaluation is limited due to positioning and  demineralization. Narrative:    EXAMINATION:  THREE XRAY VIEWS OF THE RIGHT HAND    6/26/2020 12:48 am    COMPARISON:  None. HISTORY:  ORDERING SYSTEM PROVIDED HISTORY: R hand pain, fall  TECHNOLOGIST PROVIDED HISTORY:  Reason for exam:->R hand pain, fall  Reason for Exam: fall  Acuity: Acute  Type of Exam: Initial    FINDINGS:  Degenerative changes of the MCP joints and IP joints diffusely.  Thumb CMC  and MCP joint degenerative changes.  Triscaphe degenerative change. Radiocarpal degenerative change.  Generalized swelling.                    XR SHOULDER LEFT (MIN 2 VIEWS) (Preliminary result)   Result time 06/26/20 03:10:30   Preliminary result by Vinayak Hodges MD (06/26/20 03:10:30)                Impression:    No acute osseous abnormality of the left shoulder, left elbow, and left hand.                    XR CHEST PORTABLE (Final result)   Result time 06/26/20 03:06:48   Final result by Vinayak Hodges MD (06/26/20 03:06:48)                Impression:    No acute abnormality. Narrative:    EXAMINATION:  ONE XRAY VIEW OF THE CHEST    6/26/2020 12:39 am    COMPARISON:  03/02/2018    HISTORY:  Fall. FINDINGS:  Patient is rotated. Barbara Sprawls is not enlarged.  Thoracic aorta is tortuous and  calcified.  No acute airspace disease, pleural effusion, pneumothorax.  No  acute osseous abnormality seen.                    XR PELVIS (1-2 VIEWS) (Final result)   Result time 06/26/20 02:50:19   Final result by Vinayak Hodges MD (06/26/20 02:50:19)                Impression:    No acute fracture seen although osteopenia limits evaluation. Narrative:    EXAMINATION:  ONE XRAY VIEW OF THE PELVIS    6/26/2020 12:39 am    COMPARISON:  None. HISTORY:  Fall.     FINDINGS:  Osteopenia.  No acute fracture definitely seen.  No malalignment. Degenerative changes of the lower lumbar spine.  Severe atherosclerotic  calcification. PROCEDURES  Patient Name: Keira Mejia   Medical Record Number: [id number]   Room/Bed: 04/04  Laceration Repair Procedure Note  Indication: Skin tear/lacerations of multiple locations    Procedure: The patient was placed in the appropriate position and anesthesia around the lacerations were not performed at the patient's request. The area was then cleansed with Shur-Clens and draped in a sterile fashion, irrigated with sterile water and explored with no tendon injury noted and foreign material removed which had the appearance of gravel/dirt. The laceration was closed with Dermabond and steri strips. Lacerations repaired were the left superior brow line which used Dermabond, the macerated/contaminated skin was clipped away from the left zygomatic arch region and Adaptic and Neosporin were placed on the wound, the rest were repaired with Steri-Strips using benzoin, and then Adaptic covering and 4 x 4's and gauze wrap, the left ulnar middle finger along the proximal interphalangeal joint had an approximately 2 cm skin tear, the left index finger MCP joint region radially had an approximately 2 cm skin tear, the right ulnar styloid region had an approximately 3 cm skin tear, the right third digit MCP joint had an approximately 2 cm skin tear, the left dorsal elbow region had an approximately 6 cm skin tear, and the left distal posterior upper arm had an approximately 4 cm skin tear. Total repaired wound length: 17 cm. Other Items: None    The patient tolerated the procedure well but with some pain with cleaning. Complications: None, EBL 10 cc      ED COURSE/MDM  Patient seen and evaluated. Old records reviewed. Labs and imaging reviewed and results discussed with patient.    67-year-old male status post mechanical fall, had refused c-collar in route, complaining mostly of left shoulder pain, on aspirin regimen but no other blood thinners, however given his age, the numerous injuries, skin tears, facial trauma, ordered an extensive amount of imaging, labs unremarkable with exception of slight CORA, given fluids, updated tetanus, given pain medicine, ordered more as needed pain medicine because he will likely need some prior to transport as there will be a prolonged transport time, aging patient was noted to have likely metastatic disease associated with the multiple lung nodules, he does have a history of melanoma in the past and prostate cancer, we changed out his leg bag, I feel the initial urine was likely contaminated/colonized, normal white blood cell count, no fevers or abdominal pain, will repeat with clean urine, however I will go ahead and start Rocephin given the current urine findings, and give another liter bolus, it took several hours for the first to go through, no acute fracture on imaging other than the multiple compression fractures of the lumbar spine, 2 were worsened chronic compression fractures, 1 of them appears to be new compared to previous, no evidence of cauda equina, neurovascularly intact, deemed appropriate for admission due to his significant pain, difficulty with mobility, and the new findings on imaging, patient requested to go to [facility] where previous care has been and is closer to his home, I spoke to the nurse practitioner for hospitalist service there who agreed to accept. Patient has been hypertensive during his ED visit, but I suspect this is chronic and partially due to pain, I did not feel it appropriate to precipitously lower this while in the ER, discussed case with a.m. physician Dr. Yamile Nixon at 0700, explained the patient did not require any further work-up, patient just awaiting transport.   Orders Placed This Encounter   Procedures    Culture, Urine    CT head WO contrast    XR CHEST PORTABLE    XR PELVIS (1-2 VIEWS)    CT CERVICAL SPINE WO CONTRAST    CT THORACIC SPINE WO CONTRAST    CT LUMBAR SPINE WO CONTRAST    CT MAXILLOFACIAL WO CONTRAST    XR SHOULDER LEFT (MIN 2 VIEWS)    XR ANKLE RIGHT (MIN 3 VIEWS)    XR ELBOW LEFT (2 VIEWS)    XR HAND RIGHT (MIN 3 VIEWS)    XR HAND LEFT (MIN 3 VIEWS)    CBC Auto Differential    Comprehensive Metabolic Panel w/ Reflex to MG    Ethanol    Protime-INR    APTT    Urinalysis Reflex to Culture    Microscopic Urinalysis    Urinalysis Reflex to Culture    Inpatient consult to Hospitalist    Initiate Oxygen Therapy Protocol    EKG 12 Lead     Orders Placed This Encounter   Medications    0.9 % sodium chloride bolus    morphine sulfate (PF) injection 4 mg    ondansetron (ZOFRAN) injection 4 mg    Tetanus-Diphth-Acell Pertussis (BOOSTRIX) injection 0.5 mL    morphine sulfate (PF) injection 4 mg    cefTRIAXone (ROCEPHIN) 1 g in sterile water 10 mL IV syringe    0.9 % sodium chloride bolus     ED Course as of Jun 26 0647 Fri Jun 26, 2020   0146 I spent approximately 20 minutes along with the ED medic repairing his skin laceration/tears. 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 Madison Buckner is the accepting physician at [facility]. I spoke to nurse practitioner Gamaliel Cowart, who agreed to transfer.    [SY]   6472 Though this may be urinary tract infection due to Pappas catheter, nursing stated the Pappas bag looks like it had been on for quite a while, had a very strong odor, and patient stated he only changed/emptied about twice a day, will place another Pappas bag and obtain a clean urine specimen from this to recheck. If this shows significant signs of infection as well, would initiate antibiotics.    Leukocyte Esterase, Urine(!): MODERATE [SY]      ED Course User Index  [SY] Laurence Ngo DO       CLINICAL IMPRESSION  1. Fall, initial encounter    2. Multiple contusions    3. Multiple skin tears    4. Acute pain of left shoulder    5. Acute right ankle pain    6. Bilateral hand pain    7. Essential hypertension    8. Cancer ([facility] [address]    9. Compression fracture of lumbar vertebra, initial encounter, unspecified lumbar vertebral level ([address]    10. Supplemental oxygen dependent    11. Pappas catheter in place        Blood pressure (!) 186/94, pulse 82, temperature 98 °F (36.7 °C), temperature source Tympanic, resp. rate 16, SpO2 94 %. 0461 Kal Lloyd was transferred to [facility] in [facility] in stable condition.                                Laurence Ngo DO  06/26/20 0183

## 2020-06-26 NOTE — ED NOTES
Report provided to Alfonza Aschoff, RN @ [facility], all questions addressed.       Francisco Lee RN  06/26/20 2800

## 2020-07-21 NOTE — TELEPHONE ENCOUNTER
Within this Telehealth Consent, the terms you and yours refer to the person using the Telehealth Service (Service), or in the case of a use of the Service by or on behalf of a minor, you and yours refer to and include (i) the parent or legal guardian who provides consent to the use of the Service by such minor or uses the Service on behalf of such minor, and (ii) the minor for whom consent is being provided or on whose behalf the Service is being utilized. When using Service, you will be consulting with your health care providers via the use of Telehealth.   Telehealth involves the delivery of healthcare services using electronic communications, information technology or other means between a healthcare provider and a patient who are not in the same physical location. Telehealth may be used for diagnosis, treatment, follow-up and/or patient education, and may include, but is not limited to, one or more of the following:    Electronic transmission of medical records, photo images, personal health information or other data between a patient and a healthcare provider    Interactions between a patient and healthcare provider via audio, video and/or data communications    Use of output data from medical devices, sound and video files    Anticipated Benefits   The use of Telehealth by your Provider(s) through the Service may have the following possible benefits:    Making it easier and more efficient for you to access medical care and treatment for the conditions treated by such Provider(s) utilizing the Service    Allowing you to obtain medical care and treatment by Provider(s) at times that are convenient for you    Enabling you to interact with Provider(s) without the necessity of an in-office appointment     Possible Risks   While the use of Telehealth can provide potential benefits for you, there are also potential risks associated with the use of Telehealth.  These risks include, but may not be limited to the following:    Your Provider(s) may not able to provide medical treatment for your particular condition and you may be required to seek alternative healthcare or emergency care services.  The electronic systems or other security protocols or safeguards used in the Service could fail, causing a breach of privacy of your medical or other information.  Given regulatory requirements in certain jurisdictions, your Provider(s) diagnosis and/or treatment options, especially pertaining to certain prescriptions, may be limited. Acceptance   1. You understand that Services will be provided via Telehealth. This process involves the use of HIPAA compliant and secure, real-time audio-visual interfacing with a qualified and appropriately trained provider located at [facility]. 2. You understand that, under no circumstances, will this session be recorded. 3. You understand that the Provider(s) at [facility] and other clinical participants will be party to the information obtained during the Telehealth session in accordance with best medical practices. 4. You understand that the information obtained during the Telehealth session will be used to help determine the most appropriate treatment options. 5. You understand that You have the right to revoke this consent at any point in time. 6. You understand that Telehealth is voluntary, and that continued treatment is not dependent upon consent. 7. You understand that, in the event of non-consent to Telehealth services and/or technical difficulties, you will obtain services as typically provided in the absence of Telehealth technology. 8. You understand that this consent will be kept in Your medical record. 9. No potential benefits from the use of Telehealth or specific results can be guaranteed. Your condition may not be cured or improved and, in some cases, may get worse.    10. There are limitations in the provision of medical care and treatment via Telehealth and the Service and you may not be able to receive diagnosis and/or treatment through the Service for every condition for which you seek diagnosis and/or treatment. 11. There are potential risks to the use of Telehealth, including but not limited to the risks described in this Telehealth Consent. 12. Your Provider(s) have discussed the use of Telehealth and the Service with you, including the benefits and risks of such and you have provided oral consent to your Provider(s) for the use of Telehealth and the Service. 15. You understand that it is your duty to provide your Provider(s) truthful, accurate and complete information, including all relevant information regarding care that you may have received or may be receiving from other healthcare providers outside of the Service. 14. You understand that each of your Provider(s) may determine in his or sole discretion that your condition is not suitable for diagnosis and/or treatment using the Service, and that you may need to seek medical care and treatment a specialist or other healthcare provider, outside of the Service. 15. You understand that you are fully responsible for payment for all services provided by Provider(s) or through use of the Service and that you may not be able to use third-party insurance. 16. You represent that (a) you have read this Telehealth Consent carefully, (b) you understand the risks and benefits of the Service and the use of Telehealth in the medical care and treatment provided to you by Provider(s) using the Service, and (c) you have the legal capacity and authority to provide this consent for yourself and/or the minor for which you are consenting under applicable federal and state laws, including laws relating to the age of [de-identified] and/or parental/guardian consent.    17. You give your informed consent to the use of Telehealth by Provider(s) using the Service under the terms described in the Terms of Service and this Telehealth Consent. The patient was read the following statement and has consented to the visit as of 7/21/20. The patient has been scheduled for their first telehealth visit on 7/21/20 with .

## 2020-07-21 NOTE — PROGRESS NOTES
Alon Lundy is a 81 y.o. male evaluated via telephone on 7/21/2020. Consent:  He and/or health care decision maker is aware that that he may receive a bill for this telephone service, depending on his insurance coverage, and has provided verbal consent to proceed: Yes      Documentation:  I communicated with the patient and/or health care decision maker about lung nodules. Details of this discussion including any medical advice provided: Reviewed the results of the 7/7/20 Chest CT with the patient and his nurse at the nursing facility. I explained the nodules had grown slightly since the last CAT scan at [facility] 12/19. Planes I suspect he has metastatic cancer possibly from his prostate. I reviewed the patient results from his visit with his oncologist in January with a recommendation from his oncologist at that time that no more testing or treatment be pursued as the patient cannot lightly tolerate treatment. Patient has a vague memory of this. He indicated he may be interested in treatment but not at [facility] but someplace closer. I did leave a voicemail at the [facility] given home stanislav number and with his nursing home PCP, Dr German Dawson. I will discuss this with the patient's wife and his primary care physician. He could possibly be referred for a prostate biopsy as his prostate was enlarged and he had a PSA of 10. I talked to both Dr. Trae Knox from the SNF and with the listed POA and both were in agreement that no action be taken on the nodules and that comfort is more important. I affirm this is a Patient Initiated Episode with a Patient who has not had a related appointment within my department in the past 7 days or scheduled within the next 24 hours.     Patient identification was verified at the start of the visit: Yes    Total Time: minutes: 21-30 minutes    Note: not billable if this call serves to triage the patient into an appointment for the relevant concern      LAKEISHA MATA

## 2020-09-30 NOTE — ED NOTES
Pt remains A & O x 3 & without c/o's.  Cardiac monitor applied, Pt denies c/o's     Tierra Elder, TOMY  09/30/20 [id number]

## 2020-09-30 NOTE — ED NOTES
Pt report given to [employer] in [employer].  Pt drinking H2O without incident or c/o's     Kena Naranjo RN  09/30/20 [id number]

## 2020-10-01 NOTE — ED PROVIDER NOTES
CHIEF COMPLAINT  Hypotension (Pt with reported BP of 60/30 @ [facility])      HISTORY OF PRESENT ILLNESS  Noah Juares is a 81 y.o. male presents to the ED with low blood pressure, sent in from extended care facility via EMS, [facility], they reported blood pressure 60/30, patient stated he did not know why he was here and did not want to come because he felt fine, but nursing sent him in, DNR CCA per his paperwork, based on recent chart review he has extensive metastatic lung disease on recent CT, had a history of melanoma in the past, but unclear source/potentially prostate origin, but patient reported he had not been told this, no known fevers, states he hardly eats or drinks, and has been having trouble having bowel movements for about a month, but no known melena or hematochezia, no dysuria, no headache or neck stiffness, no chest pain or shortness of breath, always has a little cough, but no significant productive sputum, only occasionally, no new numbness or weakness, no vision changes or speech changes. No sore throat or earache,  no other complaints, modifying factors or associated symptoms. I have reviewed the following from the nursing documentation. Past Medical History:   Diagnosis Date    Back pain, chronic     CAD (coronary artery disease)     Cancer (HCC)     melanoma    Degenerative disc disease at L5-S1 level     Hypertension     Psychiatric problem     depression    Shingles      Past Surgical History:   Procedure Laterality Date    BREAST BIOPSY Bilateral     CHOLECYSTECTOMY      HERNIA REPAIR      SKIN BIOPSY       History reviewed. No pertinent family history.   Social History     Socioeconomic History    Marital status: Single     Spouse name: Not on file    Number of children: Not on file    Years of education: Not on file    Highest education level: Not on file   Occupational History    Not on file   Social Needs    Financial resource strain: Not on file   Mera Gordillo Food insecurity     Worry: Not on file     Inability: Not on file    Transportation needs     Medical: Not on file     Non-medical: Not on file   Tobacco Use    Smoking status: Current Every Day Smoker     Packs/day: 0.25     Years: 61.00     Pack years: 15.25     Types: Cigarettes    Smokeless tobacco: Never Used    Tobacco comment: 5/15/19 4 cigs daily   Substance and Sexual Activity    Alcohol use: No    Drug use: No    Sexual activity: Not Currently   Lifestyle    Physical activity     Days per week: Not on file     Minutes per session: Not on file    Stress: Not on file   Relationships    Social connections     Talks on phone: Not on file     Gets together: Not on file     Attends Jehovah's witness service: Not on file     Active member of club or organization: Not on file     Attends meetings of clubs or organizations: Not on file     Relationship status: Not on file    Intimate partner violence     Fear of current or ex partner: Not on file     Emotionally abused: Not on file     Physically abused: Not on file     Forced sexual activity: Not on file   Other Topics Concern    Not on file   Social History Narrative    Not on file     No current facility-administered medications for this encounter.       Current Outpatient Medications   Medication Sig Dispense Refill    cefUROXime (CEFTIN) 250 MG tablet Take 1 tablet by mouth 2 times daily for 7 days 14 tablet 0    hydroCHLOROthiazide (HYDRODIURIL) 25 MG tablet Take 0.5 tablets by mouth every morning 30 tablet 0    carvedilol (COREG) 6.25 MG tablet Take 0.5 tablets by mouth nightly 30 tablet 0    docusate sodium (COLACE) 100 MG capsule Take 1 capsule by mouth 2 times daily as needed for Constipation 60 capsule 0    Umeclidinium Bromide (INCRUSE ELLIPTA) 62.5 MCG/INH AEPB INHALE 1 PUFF ONCE DAILY 1 each 5    albuterol sulfate HFA (VENTOLIN HFA) 108 (90 Base) MCG/ACT inhaler Inhale 2 puffs into the lungs every 6 hours as needed for Wheezing      buPROPion (WELLBUTRIN XL) 150 MG extended release tablet Take 1 tablet by mouth every morning 30 tablet 0    ALPRAZolam (XANAX) 0.25 MG tablet Take 0.25 mg by mouth daily as needed for Sleep .  polyethylene glycol (GLYCOLAX) powder Take 17 g by mouth daily      isosorbide mononitrate (IMDUR) 30 MG extended release tablet       Aspirin Buf,FbLly-MbWjw-OoRoi, (BUFFERED ASPIRIN) 325 MG TABS Take 325 mg by mouth      losartan (COZAAR) 50 MG tablet Take 100 mg by mouth daily       tamsulosin (FLOMAX) 0.4 MG capsule Take 0.4 mg by mouth daily       gabapentin (NEURONTIN) 400 MG capsule 3 times daily       busPIRone (BUSPAR) 10 MG tablet Take 15 mg by mouth 2 times daily       omeprazole (PRILOSEC) 20 MG capsule Take 20 mg by mouth daily      amiodarone (CORDARONE) 200 MG tablet Take 100 mg by mouth daily      magnesium oxide (MAG-OX) 400 MG tablet Take 400 mg by mouth daily       Allergies   Allergen Reactions    No Known Allergies        REVIEW OF SYSTEMS  10 systems reviewed, pertinent positives per HPI otherwise noted to be negative. PHYSICAL EXAM  BP (!) 145/72   Pulse 84   Temp 98.4 °F (36.9 °C) (Oral)   Resp 18   Ht 5' 6\" (1.676 m)   Wt 165 lb (74.8 kg)   SpO2 96%   BMI 26.63 kg/m²   GENERAL APPEARANCE: Awake and alert. Cooperative. No acute distress, elderly unkempt appearance  EYES: PERRL. EOM's grossly intact. No conjunctival injection  ENT: Mucous membranes are tacky/dry, airway patent, no stridor, no epistaxis, no visible otorrhea or rhinorrhea  NECK: Supple. Thoracic kyphosis/cervical lordosis, no rigidity  HEART: RRR. No murmurs  LUNGS: Respirations nonlabored. Lungs are with poor inspiratory effort but no significant wheezes crackles or rhonchi. ABDOMEN: Soft. Non-distended. Non-tender. No guarding or rebound. Normal bowel sounds. EXTREMITIES: No peripheral edema. Moves all extremities equally. All extremities neurovascularly intact. SKIN: Warm and dry. No acute rashes. Healing skin tears on right forearm, no active bleeding, no significant erythema/edema, these were cleaned and redressed  NEUROLOGICAL: Alert and oriented. No gross facial drooping. Strength 5/5, sensation intact. No truncal ataxia. Normal speech  PSYCHIATRIC: Normal mood and affect. LABS  I have reviewed all labs for this visit.    Results for orders placed or performed during the hospital encounter of 09/30/20   CBC auto differential   Result Value Ref Range    WBC 4.3 4.0 - 11.0 K/uL    RBC 2.92 (L) 4.20 - 5.90 M/uL    Hemoglobin 9.4 (L) 13.5 - 17.5 g/dL    Hematocrit 27.2 (L) 40.5 - 52.5 %    MCV 92.9 80.0 - 100.0 fL    MCH 32.0 26.0 - 34.0 pg    MCHC 34.5 31.0 - 36.0 g/dL    RDW 15.6 (H) 12.4 - 15.4 %    Platelets 008 720 - 953 K/uL    MPV 7.8 5.0 - 10.5 fL    PLATELET SLIDE REVIEW Adequate     SLIDE REVIEW see below     Neutrophils % 86.0 %    Lymphocytes % 5.0 %    Monocytes % 8.0 %    Eosinophils % 1.0 %    Basophils % 0.0 %    Neutrophils Absolute 3.7 1.7 - 7.7 K/uL    Lymphocytes Absolute 0.2 (L) 1.0 - 5.1 K/uL    Monocytes Absolute 0.3 0.0 - 1.3 K/uL    Eosinophils Absolute 0.0 0.0 - 0.6 K/uL    Basophils Absolute 0.0 0.0 - 0.2 K/uL    Anisocytosis Occasional (A)    Comprehensive Metabolic Panel w/ Reflex to MG   Result Value Ref Range    Sodium 137 136 - 145 mmol/L    Potassium reflex Magnesium 4.2 3.5 - 5.1 mmol/L    Chloride 101 99 - 110 mmol/L    CO2 27 21 - 32 mmol/L    Anion Gap 9 3 - 16    Glucose 140 (H) 70 - 99 mg/dL    BUN 37 (H) 7 - 20 mg/dL    CREATININE 1.4 (H) 0.8 - 1.3 mg/dL    GFR Non- 48 (A) >60    GFR  58 (A) >60    Calcium 8.3 8.3 - 10.6 mg/dL    Total Protein 5.2 (L) 6.4 - 8.2 g/dL    Alb 2.9 (L) 3.4 - 5.0 g/dL    Albumin/Globulin Ratio 1.3 1.1 - 2.2    Total Bilirubin 0.3 0.0 - 1.0 mg/dL    Alkaline Phosphatase 92 40 - 129 U/L    ALT 8 (L) 10 - 40 U/L    AST 11 (L) 15 - 37 U/L    Globulin 2.3 g/dL   Lactic acid, plasma   Result Value Ref Range to come to the ER, but patient states the nurse made him, he was not significantly hypotensive here, but he did appear clinically dehydrated, given small fluid bolus, blood pressure improved, he initially cannot give a urine specimen, but after he was able to, he was found after discharge to have a UTI, sent antibiotic prescription to nursing home for Ceftin, no current suspicion for sepsis or pyelonephritis, Hemoccult negative, patient reported he thought the hemoglobin drop was more so related to falls and blood loss from skin tears recently, no active bleeding here, patient is a DNR CCA, and I do not feel he requires admission at this time, transported back to [address] [place] [address]. Encouraged patient to drink plenty of water, and decreasing his diuretic and beta-blocker as these are likely contributing, his heart rate was in the 60s much of his stay, and given his poor p.o. intake of fluids, I do not feel it is necessary for him to be on 2 different diuretics, renal function close to baseline, but encouraged prompt follow-up with his physician at the nursing home, to review his medications and titrate as appropriate, he was also complaining of constipation issues, will try Colace for this, I also have concerns that the patient stated he was not aware of the metastatic cancer that was noted on his imaging over the summer, I do not know if this was done intentionally, but I mentioned it to the patient, I feel he does not likely know his prognosis, strict return precautions given, all questions answered, will return if any worsening symptoms or new concerns, see AVS for further discharge information, patient/nursing home nurse verbalized understanding of plan, felt comfortable going home.       Orders Placed This Encounter   Procedures    Culture, Blood 1    Culture, Blood 2    Culture, Urine    XR CHEST PORTABLE    CBC auto differential    Comprehensive Metabolic Panel w/ Reflex to MG    Lactic

## 2020-11-14 NOTE — ED PROVIDER NOTES
Emergency Department Attending Note    Chepe Curry MD    Date of ED VIsit: 11/14/2020    CHIEF COMPLAINT  Fall (Pt feel \"getting out of his chair, sat there to long\" at [facility]. Denies LOC. C/O left forearm pain, and right hip pain)      HISTORY OF PRESENT ILLNESS  Keegan Jon is a 81 y.o. male  With Vital signs of BP (!) 137/94   Pulse 75   Temp 97.8 °F (36.6 °C) (Oral)   Resp 16   Ht 5' 6\" (1.676 m)   Wt 166 lb (75.3 kg)   SpO2 96%   BMI 26.79 kg/m²  who presents to the ED with a complaint of fall from seated chair. Patient seen and evaluated in room 4. Patient is a resident at the [facility] and he was apparently found on the floor having fall from a chair position. Patient states he was sitting in a chair too long in his feet fell asleep and he tried to get up and he fell from a seated position. He is complaining primarily of pain in the left forearm which looks like it has a hematoma and right hip. He did not hit his neck his back or his head there was no loss of consciousness. The only concern actually primarily from the nursing home was that of his left forearm which is more of a hematoma then probably a fracture but we will radiograph it anyway. Patient is answering questions appropriately. Has no other complaints. .  No other complaints, modifying factors or associated symptoms. Patients Past medical history reviewed and listed below  Past Medical History:   Diagnosis Date    Back pain, chronic     CAD (coronary artery disease)     Cancer ([address]     melanoma    Degenerative disc disease at L5-S1 level     Hypertension     Psychiatric problem     depression    Shingles      Past Surgical History:   Procedure Laterality Date    BREAST BIOPSY Bilateral     CHOLECYSTECTOMY      HERNIA REPAIR      SKIN BIOPSY         I have reviewed the following from the nursing documentation. History reviewed. No pertinent family history.   Social History     Socioeconomic History    Marital status: Single     Spouse name: Not on file    Number of children: Not on file    Years of education: Not on file    Highest education level: Not on file   Occupational History    Not on file   Social Needs    Financial resource strain: Not on file    Food insecurity     Worry: Not on file     Inability: Not on file    Transportation needs     Medical: Not on file     Non-medical: Not on file   Tobacco Use    Smoking status: Current Every Day Smoker     Packs/day: 0.25     Years: 61.00     Pack years: 15.25     Types: Cigarettes    Smokeless tobacco: Never Used    Tobacco comment: 5/15/19 4 cigs daily   Substance and Sexual Activity    Alcohol use: No    Drug use: No    Sexual activity: Not Currently   Lifestyle    Physical activity     Days per week: Not on file     Minutes per session: Not on file    Stress: Not on file   Relationships    Social connections     Talks on phone: Not on file     Gets together: Not on file     Attends Jainism service: Not on file     Active member of club or organization: Not on file     Attends meetings of clubs or organizations: Not on file     Relationship status: Not on file    Intimate partner violence     Fear of current or ex partner: Not on file     Emotionally abused: Not on file     Physically abused: Not on file     Forced sexual activity: Not on file   Other Topics Concern    Not on file   Social History Narrative    Not on file     No current facility-administered medications for this encounter.       Current Outpatient Medications   Medication Sig Dispense Refill    acetaminophen (TYLENOL) 325 MG tablet Take 650 mg by mouth every 6 hours as needed for Pain      aspirin 81 MG EC tablet Take 81 mg by mouth daily      diphenhydrAMINE (BENADRYL) 25 MG capsule Take 25 mg by mouth every 6 hours as needed for Itching      fluticasone-vilanterol (BREO ELLIPTA) 100-25 MCG/INH AEPB inhaler Inhale into the lungs daily      cyanocobalamin 1000 MCG tablet Take 1,000 mcg by mouth daily      doxazosin (CARDURA) 4 MG tablet Take 4 mg by mouth nightly      ferrous sulfate (IRON 325) 325 (65 Fe) MG tablet Take 325 mg by mouth daily (with breakfast)      albuterol-ipratropium (COMBIVENT RESPIMAT)  MCG/ACT AERS inhaler Inhale 1 puff into the lungs every 6 hours      levETIRAcetam (KEPPRA) 1000 MG tablet Take 1,000 mg by mouth 2 times daily      magnesium hydroxide (MILK OF MAGNESIA) 400 MG/5ML suspension Take by mouth daily as needed for Constipation      Multiple Vitamins-Minerals (MULTIVITAMIN ADULT PO) Take by mouth      potassium chloride (KLOR-CON) 10 MEQ extended release tablet Take 10 mEq by mouth daily      Albuterol Sulfate (PROAIR HFA IN) Inhale into the lungs      hydroCHLOROthiazide (HYDRODIURIL) 25 MG tablet Take 0.5 tablets by mouth every morning 30 tablet 0    carvedilol (COREG) 6.25 MG tablet Take 0.5 tablets by mouth nightly 30 tablet 0    docusate sodium (COLACE) 100 MG capsule Take 1 capsule by mouth 2 times daily as needed for Constipation 60 capsule 0    Umeclidinium Bromide (INCRUSE ELLIPTA) 62.5 MCG/INH AEPB INHALE 1 PUFF ONCE DAILY 1 each 5    buPROPion (WELLBUTRIN XL) 150 MG extended release tablet Take 1 tablet by mouth every morning (Patient taking differently: Take 300 mg by mouth every morning ) 30 tablet 0    isosorbide mononitrate (IMDUR) 30 MG extended release tablet       losartan (COZAAR) 50 MG tablet Take 100 mg by mouth daily       tamsulosin (FLOMAX) 0.4 MG capsule Take 0.4 mg by mouth daily       gabapentin (NEURONTIN) 400 MG capsule 3 times daily       busPIRone (BUSPAR) 10 MG tablet Take 15 mg by mouth 2 times daily       omeprazole (PRILOSEC) 20 MG capsule Take 20 mg by mouth daily      amiodarone (CORDARONE) 200 MG tablet Take 100 mg by mouth daily      magnesium oxide (MAG-OX) 400 MG tablet Take 400 mg by mouth daily       Allergies   Allergen Reactions    No Known Allergies        REVIEW OF SYSTEMS  10 systems reviewed, pertinent positives per HPI otherwise noted to be negative     PHYSICAL EXAM  BP (!) 137/94   Pulse 75   Temp 97.8 °F (36.6 °C) (Oral)   Resp 16   Ht 5' 6\" (1.676 m)   Wt 166 lb (75.3 kg)   SpO2 96%   BMI 26.79 kg/m²   GENERAL APPEARANCE: Awake and alert. Cooperative. In no obvious distress. HEAD: Normocephalic. Atraumatic. EYES: PERRL. EOM's grossly intact. ENT: Mucous membranes are pink and moist.   NECK: Supple. HEART: RRR. No murmurs. LUNGS: Respirations unlabored. CTAB. Good air exchange. ABDOMEN: Soft. Non-distended. Non-tender. No masses. No organomegaly. No guarding or rebound. EXTREMITIES: No peripheral edema. Moves all extremities equally. All extremities neurovascularly intact. Patient has pain over the right hip area. He also has pain in the left forearm area as well. He is distally neurovascularly intact there is no leg shortening noted. SKIN: Warm and dry. No acute rashes. NEUROLOGICAL: Alert and oriented. Strength 5/5, sensation intact. Gait normal.   PSYCHIATRIC: Normal mood and affect. No HI or SI expressed to me. RADIOLOGY    If acquired see below     EKG:     If acquired see below       ED COURSE/MDM    Patient had no fractures on the 3 radiographs he had performed. So at this point he will be sent back to the nursing home with a clean bill of orthopedic health. We will wrap his left forearm to reduce the hematoma. ED Course as of Nov 14 0131   Sat Nov 14, 2020   0128 IMPRESSION:  1. No radiographic evidence of left forearm are left hand acute trauma. 2. Mild intracarpal wrist degenerative joint disease. 3. Mild-to-moderate hand degenerative joint changes, as discussed above. [DL]   0130 IMPRESSION:  1. No radiographic evidence of acute pelvic or right hip trauma. 2. 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 3. Redemonstration of focal sclerosis involving the left ischial marrow.   Cannot exclude association with sclerotic metastatic disease. Recommend  clinical correlation. 4. Diffuse osteopenia. XR HIP RIGHT (2-3 VIEWS) [DL]      ED Course User Index  [DL] Jf Cody MD       The ED course and plan were reviewed and results discussed with the patient    The patient understood and agreed with the Discharge/transfer planning.     CLINICAL IMPRESSION and [id number] was stable and diagnosed with multiple contusions, left forearm hematoma       Jf Cody MD  11/14/20 0134

## 2020-11-29 NOTE — ED NOTES
Bed: 17  Expected date:   Expected time:   Means of arrival:   Comments:  [facility]  11/29/20 1432 [id number] Helio Chamberlain MD  11/29/20 3716

## 2020-11-30 NOTE — ED NOTES
Writer in patients room to obtain a urine sample per MD order. Patient has preexisting leg bag catheter in place. Writer observed patient's catheter tube to be discolored, with a dark reddish purple appearance. Patient's leg bag  was discolored with greenish black coating covering the inside of the collection bad. The saline from the balloon when deflated returned a light brown color. Pictures added to patients chart. Patients lux catheter and bag changed per MD order. And patient cleaned up. Patient tolerated well.       Raulito Quezada RN  11/29/20 3835

## 2020-11-30 NOTE — ED NOTES
Jarrod 27 to Terri Maya at [facility], ETA for flynn is [id number] [facility]  11/30/20 3216

## 2020-11-30 NOTE — ED PROVIDER NOTES
[address] ED  EMERGENCY DEPARTMENT ENCOUNTER        Pt Name: Savanna Triplett  MRN: [id number]  [place] 1935  Date of evaluation: 11/29/2020  Provider: BLAINE Hassan  PCP: Pa Bhakta    This patient was seen and evaluated by the attending physician Fara Sandifer, [address] [facility] [address]       Chief Complaint   Patient presents with    Fall     unwitnessed fall at the [id number] [place] [address] today, pt is covid + and is on [facility], [address] and confused is normal for him (on assessment for ems c/o right arm and left hip pain)       HISTORY OF PRESENT ILLNESS   (Location/Symptom, Timing/Onset, Context/Setting, Quality, Duration, Modifying Factors, Severity)  Note limiting factors. Savanna Triplett is a 81 y.o. male with past medical history of COPD, CHF, who presents via EMS from his nursing home for evaluation of unwitnessed fall. Patient is Covid positive. Per nursing report patient had an unwitnessed fall versus collapse at the nursing home. He is at his baseline confusion. Per EMS he was complaining of right arm and left hip pain. Patient is a poor historian. He notes that he was trying to open a door when he fell. He notes that he was using his walker. Patient currently complains of pain everywhere. Nursing Notes were all reviewed and agreed with or any disagreements were addressed  in the HPI. Pt was seen during the [place] 19 pandemic. Appropriate PPE worn by ME during patient encounters. Pt seen during a time with constrained hospital bed capacity and other potential inpatient and outpatient resources were constrained due to the viral pandemic. REVIEW OF SYSTEMS    (2-9 systems for level 4, 10 or more for level 5)     Review of Systems    Positives and Pertinent negatives as per HPI. Except as noted abovein the ROS, all other systems were reviewed and negative.        PAST MEDICAL HISTORY     Past Medical History:   Diagnosis Date    Anemia     Anxiety     [facility]     Back pain, chronic     CAD (coronary artery disease)     Cancer (HCC)     melanoma    Chronic GERD     Degenerative disc disease at L5-S1 level     Fracture of one rib, right side, subsequent encounter for fracture with routine healing     History of falling     Hypertension     Hypokalemia     Major depressive disorder     Malignant neoplasm of prostate ([facility]     Nicotine dependence     Psychiatric problem     depression    Seasonal allergic rhinitis     Shingles     Unspecified abnormalities of gait and mobility     Vitamin B12 deficiency          SURGICAL HISTORY     Past Surgical History:   Procedure Laterality Date    BREAST BIOPSY Bilateral     CHOLECYSTECTOMY      HERNIA REPAIR      SKIN BIOPSY           CURRENTMEDICATIONS       Previous Medications    ACETAMINOPHEN (TYLENOL) 325 MG TABLET    Take 650 mg by mouth every 6 hours as needed for Pain    ALBUTEROL SULFATE (PROAIR HFA IN)    Inhale into the lungs    ALBUTEROL-IPRATROPIUM (COMBIVENT RESPIMAT)  MCG/ACT AERS INHALER    Inhale 1 puff into the lungs every 6 hours    AMIODARONE (CORDARONE) 200 MG TABLET    Take 100 mg by mouth daily    ASPIRIN 81 MG EC TABLET    Take 81 mg by mouth daily    BUPROPION (WELLBUTRIN XL) 150 MG EXTENDED RELEASE TABLET    Take 1 tablet by mouth every morning    BUSPIRONE (BUSPAR) 10 MG TABLET    Take 15 mg by mouth 2 times daily     CARVEDILOL (COREG) 6.25 MG TABLET    Take 0.5 tablets by mouth nightly    CYANOCOBALAMIN 1000 MCG TABLET    Take 1,000 mcg by mouth daily    DIPHENHYDRAMINE (BENADRYL) 25 MG CAPSULE    Take 25 mg by mouth every 6 hours as needed for Itching    DOCUSATE SODIUM (COLACE) 100 MG CAPSULE    Take 1 capsule by mouth 2 times daily as needed for Constipation    DOXAZOSIN (CARDURA) 4 MG TABLET    Take 4 mg by mouth nightly    FERROUS SULFATE (IRON 325) 325 (65 FE) MG TABLET    Take 325 mg by mouth daily (with breakfast)    FLUTICASONE-VILANTEROL (BREO ELLIPTA) 100-25 MCG/INH AEPB INHALER    Inhale into the lungs daily    GABAPENTIN (NEURONTIN) 400 MG CAPSULE    3 times daily     HYDROCHLOROTHIAZIDE (HYDRODIURIL) 25 MG TABLET    Take 0.5 tablets by mouth every morning    ISOSORBIDE MONONITRATE (IMDUR) 30 MG EXTENDED RELEASE TABLET        LEVETIRACETAM (KEPPRA) 1000 MG TABLET    Take 1,000 mg by mouth 2 times daily    LOSARTAN (COZAAR) 50 MG TABLET    Take 100 mg by mouth daily     MAGNESIUM HYDROXIDE (MILK OF MAGNESIA) 400 MG/5ML SUSPENSION    Take by mouth daily as needed for Constipation    MAGNESIUM OXIDE (MAG-OX) 400 MG TABLET    Take 400 mg by mouth daily    MULTIPLE VITAMINS-MINERALS (MULTIVITAMIN ADULT PO)    Take by mouth    OMEPRAZOLE (PRILOSEC) 20 MG CAPSULE    Take 20 mg by mouth daily    POTASSIUM CHLORIDE (KLOR-CON) 10 MEQ EXTENDED RELEASE TABLET    Take 10 mEq by mouth daily    TAMSULOSIN (FLOMAX) 0.4 MG CAPSULE    Take 0.4 mg by mouth daily     UMECLIDINIUM BROMIDE (INCRUSE ELLIPTA) 62.5 MCG/INH AEPB    INHALE 1 PUFF ONCE DAILY         ALLERGIES     No known allergies    FAMILYHISTORY     No family history on file.        SOCIAL HISTORY       Social History     Socioeconomic History    Marital status: Single     Spouse name: Not on file    Number of children: Not on file    Years of education: Not on file    Highest education level: Not on file   Occupational History    Not on file   Social Needs    Financial resource strain: Not on file    Food insecurity     Worry: Not on file     Inability: Not on file    Transportation needs     Medical: Not on file     Non-medical: Not on file   Tobacco Use    Smoking status: Current Every Day Smoker     Packs/day: 0.25     Years: 61.00     Pack years: 15.25     Types: Cigarettes    Smokeless tobacco: Never Used    Tobacco comment: 5/15/19 4 cigs daily   Substance and Sexual Activity    Alcohol use: No    Drug use: No    Sexual activity: Not Currently   Lifestyle    Physical activity     Days per week: Not are moist. No injury. Pharynx: Oropharynx is clear. Uvula midline. Eyes:      General: Lids are normal. Vision grossly intact. Gaze aligned appropriately. Right eye: No discharge. Left eye: No discharge. Extraocular Movements: Extraocular movements intact. Right eye: No nystagmus. Left eye: No nystagmus. Conjunctiva/sclera: Conjunctivae normal.   Neck:      Musculoskeletal: Normal range of motion and neck supple. Muscular tenderness present. No neck rigidity. Trachea: Trachea and phonation normal.   Cardiovascular:      Rate and Rhythm: Normal rate and regular rhythm. Pulses: Normal pulses. Radial pulses are 2+ on the right side and 2+ on the left side. Posterior tibial pulses are 2+ on the right side and 2+ on the left side. Heart sounds: Normal heart sounds. No murmur. No friction rub. No gallop. Pulmonary:      Effort: Pulmonary effort is normal. No tachypnea, bradypnea, accessory muscle usage, prolonged expiration, respiratory distress or retractions. Breath sounds: Normal breath sounds. No wheezing or rales. Chest:      Chest wall: Tenderness (Diffuse tenderness to palpation without bony step-off crepitus or bruising.) present. No mass, lacerations, deformity, swelling, crepitus or edema. Abdominal:      General: Abdomen is flat. Bowel sounds are normal. There is no distension or abdominal bruit. There are no signs of injury. Palpations: Abdomen is soft. Tenderness: There is generalized abdominal tenderness. Comments: Diffuse tenderness to palpation without bruising or point tenderness. No rebound or guarding. Musculoskeletal:      Right wrist: Normal. He exhibits normal range of motion, no tenderness, no bony tenderness and no swelling. Right hip: He exhibits normal strength. Left hip: He exhibits normal strength. Cervical back: He exhibits tenderness and bony tenderness.  He exhibits no PANEL W/ REFLEX TO MG FOR LOW K - Abnormal; Notable for the following components:    BUN 26 (*)     Calcium 8.2 (*)     Total Protein 5.6 (*)     Alb 3.1 (*)     Alkaline Phosphatase 140 (*)     ALT 7 (*)     AST 13 (*)     All other components within normal limits    Narrative:     Performed at:  [facility]) - [facility],  [place], [facility]   Phone [phone]   BRAIN NATRIURETIC PEPTIDE - Abnormal; Notable for the following components:    Pro- (*)     All other components within normal limits    Narrative:     Performed at:  [facility],  [place], [facility]   Phone [phone]   TROPONIN    Narrative:     Performed at:  [facility]) - [facility],  [place], [facility]   Phone [phone]   LACTIC ACID, PLASMA    Narrative:     Performed at:  [facility]) - [facility],  [place], [facility]   Phone [phone]   URINALYSIS       All other labs were within normal range or not returned as of this dictation. EKG: All EKG's are interpreted by the Emergency Department Physician who either signs orCo-signs this chart in the absence of a cardiologist.  Please see their note for interpretation of EKG. RADIOLOGY:   Non-plain film images such as CT, Ultrasound and MRI are read by the radiologist. Plain radiographic images are visualized andpreliminarily interpreted by the  ED Provider with the below findings:        Interpretation perthe Radiologist below, if available at the time of this note:    CT HEAD WO CONTRAST   Final Result   No acute intracranial abnormality. XR RADIUS ULNA RIGHT (2 VIEWS)   Final Result   No acute osseous abnormality of the right forearm. Diffuse osteopenia.          CT CERVICAL SPINE WO CONTRAST    (Results Pending)   CT THORACIC SPINE TRAUMA RECONSTRUCTION    (Results Pending)   CT CHEST ABDOMEN PELVIS W CONTRAST    (Results Pending)   CT LUMBAR SPINE TRAUMA RECONSTRUCTION    (Results Pending)     Xr Radius Ulna Right (2 Views)    Result Date: 11/29/2020  EXAMINATION: TWO XRAY VIEWS OF THE RIGHT FOREARM 11/29/2020 6:43 pm COMPARISON: None. HISTORY: ORDERING SYSTEM PROVIDED HISTORY: injury TECHNOLOGIST PROVIDED HISTORY: Reason for exam:->injury Reason for Exam: they think pt fell, nobody is sure. pt can't comunicate Acuity: Acute Type of Exam: Initial FINDINGS: The bones are diffusely osteopenic. No acute fracture, dislocation or other osseous abnormality. Scattered vascular calcification. No other focal soft tissue abnormality. No acute osseous abnormality of the right forearm. Diffuse osteopenia. Ct Head Wo Contrast    Result Date: 11/29/2020  EXAMINATION: CT OF THE HEAD WITHOUT CONTRAST  11/29/2020 5:56 pm TECHNIQUE: CT of the head was performed without the administration of intravenous contrast. Dose modulation, iterative reconstruction, and/or weight based adjustment of the mA/kV was utilized to reduce the radiation dose to as low as reasonably achievable. COMPARISON: None. HISTORY: ORDERING SYSTEM PROVIDED HISTORY: unwittenessed fall TECHNOLOGIST PROVIDED HISTORY: If patient is on cardiac monitor and/or pulse ox, they may be taken off cardiac monitor and pulse ox, left on O2 if currently on. All monitors reattached when patient returns to room. Has a \"code stroke\" or \"stroke alert\" been called? ->No Reason for exam:->unwittenessed fall Reason for Exam: unwitnessed fall Pain FINDINGS: BRAIN/VENTRICLES: There is no acute intracranial hemorrhage, mass effect or midline shift. No abnormal extra-axial fluid collection. The gray-white differentiation is maintained without evidence of an acute infarct. There is no evidence of hydrocephalus. ORBITS: The visualized portion of the orbits demonstrate no acute abnormality.  SINUSES: The visualized paranasal sinuses and mastoid air cells demonstrate no acute abnormality. SOFT TISSUES/SKULL:  No acute abnormality of the visualized skull or soft tissues. No acute intracranial abnormality. PROCEDURES   Unless otherwise noted below, none     Procedures    CRITICAL CARE TIME   N/A    CONSULTS:  None      EMERGENCY DEPARTMENT COURSE and DIFFERENTIALDIAGNOSIS/MDM:   Vitals:    Vitals:    11/29/20 1635 11/29/20 1639 11/29/20 1807   BP:  (!) 190/97 (!) 192/87   Pulse: 77  67   Resp: 25  24   Temp: 98.7 °F (37.1 °C)     TempSrc: Oral     SpO2: 97%  100%   Weight: 166 lb (75.3 kg)         Patient was given thefollowing medications:  Medications   iopamidol (ISOVUE-370) 76 % injection 75 mL (75 mLs Intravenous Given 11/29/20 1840)       PDMP Monitoring:    Last PDMP [employer] as Reviewed [facility]):  Review User Review Instant Review Result            Urine Drug Screenings (1 yr)     No resulted procedures found. Medication Contract and Consent for Opioid Use Documents Filed      No documents found                MDM:   Patient seen and evaluated. Old records reviewed. Diagnostic testing reviewed and results discussed. Patient is an 69-year-old male who presents for evaluation of an unwitnessed fall versus collapse at outside nursing home. On exam he is alert, he is oriented to self time. He is otherwise confused. He has generalized pain everywhere. No evidence of penetrating trauma or significant deformity. No evidence of basilar skull fracture. Patient is requiring supplemental oxygen, however he is at his baseline oxygen use. Overall a very low concern for PE. Patient is Covid positive, I see no evidence of acute significant respiratory distress. His abdomen is diffusely tender, his chest wall is diffusely tender, he has diffuse tenderness along the entire bony spine. No evidence of hip fracture on physical exam.  Work-up initiated in the emergency department with labs and imaging.     6:00 PM: I discussed the history, physical, and treatment plan with Dr. Eliazar Min, [address] [place] [address]. Joey Boateng was signed out in stable condition. Please see Dr. Nate Grimes note for further details, including diagnosis and disposition. FINAL IMPRESSION      1. COVID-19    2. Fall, initial encounter          DISPOSITION/PLAN   DISPOSITION        PATIENT REFERREDTO:  No follow-up provider specified.     DISCHARGE MEDICATIONS:  New Prescriptions    No medications on file       DISCONTINUED MEDICATIONS:  Discontinued Medications    No medications on file              (Please note that portions ofthis note were completed with a voice recognition program.  Efforts were made to edit the dictations but occasionally words are mis-transcribed.)    Kane Salazar (electronically signed)       Kane Salazar  11/29/20 4284

## 2020-12-07 NOTE — RESULT ENCOUNTER NOTE
Culture reviewed, he has a Morganella UTI, patient had not been started on antibiotics in the ED because it was thought the urine was only colonization, I recommend starting Levaquin p.o. daily x7 days.

## 2021-01-01 ENCOUNTER — APPOINTMENT (OUTPATIENT)
Dept: GENERAL RADIOLOGY | Age: 86
DRG: 871 | End: 2021-01-01
Payer: MEDICARE

## 2021-01-01 ENCOUNTER — HOSPITAL ENCOUNTER (INPATIENT)
Age: 86
LOS: 1 days | DRG: 189 | End: 2021-01-20
Attending: INTERNAL MEDICINE | Admitting: INTERNAL MEDICINE
Payer: COMMERCIAL

## 2021-01-01 ENCOUNTER — VIRTUAL VISIT (OUTPATIENT)
Dept: FAMILY MEDICINE CLINIC | Age: 86
End: 2021-01-01
Payer: MEDICARE

## 2021-01-01 ENCOUNTER — APPOINTMENT (OUTPATIENT)
Dept: CT IMAGING | Age: 86
DRG: 871 | End: 2021-01-01
Payer: MEDICARE

## 2021-01-01 ENCOUNTER — APPOINTMENT (OUTPATIENT)
Dept: INTERVENTIONAL RADIOLOGY/VASCULAR | Age: 86
DRG: 871 | End: 2021-01-01
Payer: MEDICARE

## 2021-01-01 ENCOUNTER — HOSPITAL ENCOUNTER (INPATIENT)
Age: 86
LOS: 5 days | Discharge: HOSPICE | DRG: 871 | End: 2021-01-19
Attending: STUDENT IN AN ORGANIZED HEALTH CARE EDUCATION/TRAINING PROGRAM | Admitting: HOSPITALIST
Payer: MEDICARE

## 2021-01-01 VITALS
HEIGHT: 66 IN | SYSTOLIC BLOOD PRESSURE: 99 MMHG | OXYGEN SATURATION: 97 % | BODY MASS INDEX: 26.03 KG/M2 | HEART RATE: 78 BPM | DIASTOLIC BLOOD PRESSURE: 67 MMHG | WEIGHT: 162 LBS | TEMPERATURE: 97.5 F | RESPIRATION RATE: 25 BRPM

## 2021-01-01 DIAGNOSIS — N48.1 BALANITIS: ICD-10-CM

## 2021-01-01 DIAGNOSIS — A41.9 SEPTIC SHOCK (HCC): ICD-10-CM

## 2021-01-01 DIAGNOSIS — D50.0 BLOOD LOSS ANEMIA: ICD-10-CM

## 2021-01-01 DIAGNOSIS — K92.0 GASTROINTESTINAL HEMORRHAGE WITH HEMATEMESIS: Primary | ICD-10-CM

## 2021-01-01 DIAGNOSIS — I95.9 HYPOTENSION, UNSPECIFIED HYPOTENSION TYPE: ICD-10-CM

## 2021-01-01 DIAGNOSIS — R65.21 SEPTIC SHOCK (HCC): ICD-10-CM

## 2021-01-01 DIAGNOSIS — I63.9 CEREBROVASCULAR ACCIDENT (CVA), UNSPECIFIED MECHANISM (HCC): Primary | ICD-10-CM

## 2021-01-01 DIAGNOSIS — J96.01 ACUTE RESPIRATORY FAILURE WITH HYPOXIA (HCC): ICD-10-CM

## 2021-01-01 DIAGNOSIS — N17.9 AKI (ACUTE KIDNEY INJURY) (HCC): ICD-10-CM

## 2021-01-01 DIAGNOSIS — C61 PROSTATE CANCER (HCC): ICD-10-CM

## 2021-01-01 DIAGNOSIS — R33.9 URINARY RETENTION: ICD-10-CM

## 2021-01-01 DIAGNOSIS — J18.9 PNEUMONIA, UNSPECIFIED ORGANISM: ICD-10-CM

## 2021-01-01 DIAGNOSIS — J41.1 MUCOPURULENT CHRONIC BRONCHITIS (HCC): ICD-10-CM

## 2021-01-01 LAB
A/G RATIO: 1.1 (ref 1.1–2.2)
ABO/RH: NORMAL
ALBUMIN SERPL-MCNC: 1.6 G/DL (ref 3.4–5)
ALBUMIN SERPL-MCNC: 1.8 G/DL (ref 3.4–5)
ALBUMIN SERPL-MCNC: 2.1 G/DL (ref 3.4–5)
ALBUMIN SERPL-MCNC: 2.2 G/DL (ref 3.4–5)
ALBUMIN SERPL-MCNC: 2.8 G/DL (ref 3.4–5)
ALP BLD-CCNC: 147 U/L (ref 40–129)
ALT SERPL-CCNC: 13 U/L (ref 10–40)
ANION GAP SERPL CALCULATED.3IONS-SCNC: 10 MMOL/L (ref 3–16)
ANION GAP SERPL CALCULATED.3IONS-SCNC: 11 MMOL/L (ref 3–16)
ANION GAP SERPL CALCULATED.3IONS-SCNC: 12 MMOL/L (ref 3–16)
ANION GAP SERPL CALCULATED.3IONS-SCNC: 12 MMOL/L (ref 3–16)
ANION GAP SERPL CALCULATED.3IONS-SCNC: 13 MMOL/L (ref 3–16)
ANISOCYTOSIS: ABNORMAL
ANISOCYTOSIS: ABNORMAL
ANTIBODY SCREEN: NORMAL
APTT: 29.2 SEC (ref 24.2–36.2)
AST SERPL-CCNC: 22 U/L (ref 15–37)
BANDED NEUTROPHILS RELATIVE PERCENT: 29 % (ref 0–7)
BANDED NEUTROPHILS RELATIVE PERCENT: 3 % (ref 0–7)
BANDED NEUTROPHILS RELATIVE PERCENT: 30 % (ref 0–7)
BASE EXCESS ARTERIAL: -2.4 MMOL/L (ref -3–3)
BASE EXCESS ARTERIAL: -6.8 MMOL/L (ref -3–3)
BASE EXCESS ARTERIAL: -6.8 MMOL/L (ref -3–3)
BASE EXCESS ARTERIAL: -7.5 MMOL/L (ref -3–3)
BASOPHILS ABSOLUTE: 0 K/UL (ref 0–0.2)
BASOPHILS RELATIVE PERCENT: 0 %
BASOPHILS RELATIVE PERCENT: 0.1 %
BASOPHILS RELATIVE PERCENT: 0.2 %
BILIRUB SERPL-MCNC: 0.6 MG/DL (ref 0–1)
BLOOD BANK DISPENSE STATUS: NORMAL
BLOOD BANK PRODUCT CODE: NORMAL
BPU ID: NORMAL
BUN BLDV-MCNC: 101 MG/DL (ref 7–20)
BUN BLDV-MCNC: 70 MG/DL (ref 7–20)
BUN BLDV-MCNC: 78 MG/DL (ref 7–20)
BUN BLDV-MCNC: 80 MG/DL (ref 7–20)
BUN BLDV-MCNC: 84 MG/DL (ref 7–20)
BUN BLDV-MCNC: 89 MG/DL (ref 7–20)
BUN BLDV-MCNC: 92 MG/DL (ref 7–20)
CALCIUM SERPL-MCNC: 6.1 MG/DL (ref 8.3–10.6)
CALCIUM SERPL-MCNC: 6.8 MG/DL (ref 8.3–10.6)
CALCIUM SERPL-MCNC: 6.9 MG/DL (ref 8.3–10.6)
CALCIUM SERPL-MCNC: 7 MG/DL (ref 8.3–10.6)
CALCIUM SERPL-MCNC: 7.1 MG/DL (ref 8.3–10.6)
CALCIUM SERPL-MCNC: 7.6 MG/DL (ref 8.3–10.6)
CALCIUM SERPL-MCNC: 8.1 MG/DL (ref 8.3–10.6)
CARBOXYHEMOGLOBIN ARTERIAL: 0.3 % (ref 0–1.5)
CARBOXYHEMOGLOBIN ARTERIAL: 0.4 % (ref 0–1.5)
CHLORIDE BLD-SCNC: 102 MMOL/L (ref 99–110)
CHLORIDE BLD-SCNC: 103 MMOL/L (ref 99–110)
CHLORIDE BLD-SCNC: 104 MMOL/L (ref 99–110)
CHLORIDE BLD-SCNC: 108 MMOL/L (ref 99–110)
CHLORIDE BLD-SCNC: 97 MMOL/L (ref 99–110)
CO2: 17 MMOL/L (ref 21–32)
CO2: 21 MMOL/L (ref 21–32)
CO2: 21 MMOL/L (ref 21–32)
CO2: 22 MMOL/L (ref 21–32)
CO2: 22 MMOL/L (ref 21–32)
CO2: 24 MMOL/L (ref 21–32)
CO2: 27 MMOL/L (ref 21–32)
CREAT SERPL-MCNC: 2.2 MG/DL (ref 0.8–1.3)
CREAT SERPL-MCNC: 2.3 MG/DL (ref 0.8–1.3)
CREAT SERPL-MCNC: 2.3 MG/DL (ref 0.8–1.3)
CREAT SERPL-MCNC: 2.5 MG/DL (ref 0.8–1.3)
CREAT SERPL-MCNC: 2.8 MG/DL (ref 0.8–1.3)
DESCRIPTION BLOOD BANK: NORMAL
EKG ATRIAL RATE: 93 BPM
EKG DIAGNOSIS: NORMAL
EKG P AXIS: 50 DEGREES
EKG P-R INTERVAL: 164 MS
EKG Q-T INTERVAL: 358 MS
EKG QRS DURATION: 92 MS
EKG QTC CALCULATION (BAZETT): 445 MS
EKG R AXIS: 61 DEGREES
EKG T AXIS: 90 DEGREES
EKG VENTRICULAR RATE: 93 BPM
EOSINOPHILS ABSOLUTE: 0 K/UL (ref 0–0.6)
EOSINOPHILS RELATIVE PERCENT: 0 %
EOSINOPHILS RELATIVE PERCENT: 0.1 %
GFR AFRICAN AMERICAN: 26
GFR AFRICAN AMERICAN: 30
GFR AFRICAN AMERICAN: 33
GFR AFRICAN AMERICAN: 33
GFR AFRICAN AMERICAN: 35
GFR NON-AFRICAN AMERICAN: 22
GFR NON-AFRICAN AMERICAN: 25
GFR NON-AFRICAN AMERICAN: 27
GFR NON-AFRICAN AMERICAN: 27
GFR NON-AFRICAN AMERICAN: 29
GLOBULIN: 2.5 G/DL
GLUCOSE BLD-MCNC: 104 MG/DL (ref 70–99)
GLUCOSE BLD-MCNC: 118 MG/DL (ref 70–99)
GLUCOSE BLD-MCNC: 126 MG/DL (ref 70–99)
GLUCOSE BLD-MCNC: 136 MG/DL (ref 70–99)
GLUCOSE BLD-MCNC: 143 MG/DL (ref 70–99)
GLUCOSE BLD-MCNC: 149 MG/DL (ref 70–99)
GLUCOSE BLD-MCNC: 152 MG/DL (ref 70–99)
GLUCOSE BLD-MCNC: 153 MG/DL (ref 70–99)
HCO3 ARTERIAL: 18.5 MMOL/L (ref 21–29)
HCO3 ARTERIAL: 20 MMOL/L (ref 21–29)
HCO3 ARTERIAL: 21.2 MMOL/L (ref 21–29)
HCO3 ARTERIAL: 24.5 MMOL/L (ref 21–29)
HCT VFR BLD CALC: 24 % (ref 40.5–52.5)
HCT VFR BLD CALC: 25 % (ref 40.5–52.5)
HCT VFR BLD CALC: 25.2 % (ref 40.5–52.5)
HCT VFR BLD CALC: 26.3 % (ref 40.5–52.5)
HCT VFR BLD CALC: 27.1 % (ref 40.5–52.5)
HCT VFR BLD CALC: 27.2 % (ref 40.5–52.5)
HCT VFR BLD CALC: 27.7 % (ref 40.5–52.5)
HCT VFR BLD CALC: 29.2 % (ref 40.5–52.5)
HCT VFR BLD CALC: 29.7 % (ref 40.5–52.5)
HCT VFR BLD CALC: 30 % (ref 40.5–52.5)
HCT VFR BLD CALC: 31 % (ref 40.5–52.5)
HCT VFR BLD CALC: 31.5 % (ref 40.5–52.5)
HCT VFR BLD CALC: 33.8 % (ref 40.5–52.5)
HCT VFR BLD CALC: 35.5 % (ref 40.5–52.5)
HCT VFR BLD CALC: 37.8 % (ref 40.5–52.5)
HEMOGLOBIN, ART, EXTENDED: 10.4 G/DL (ref 13.5–17.5)
HEMOGLOBIN, ART, EXTENDED: 10.6 G/DL (ref 13.5–17.5)
HEMOGLOBIN, ART, EXTENDED: 10.6 G/DL (ref 13.5–17.5)
HEMOGLOBIN, ART, EXTENDED: 12.1 G/DL (ref 13.5–17.5)
HEMOGLOBIN: 10.1 G/DL (ref 13.5–17.5)
HEMOGLOBIN: 11.1 G/DL (ref 13.5–17.5)
HEMOGLOBIN: 11.8 G/DL (ref 13.5–17.5)
HEMOGLOBIN: 12.3 G/DL (ref 13.5–17.5)
HEMOGLOBIN: 8 G/DL (ref 13.5–17.5)
HEMOGLOBIN: 8.2 G/DL (ref 13.5–17.5)
HEMOGLOBIN: 8.3 G/DL (ref 13.5–17.5)
HEMOGLOBIN: 8.7 G/DL (ref 13.5–17.5)
HEMOGLOBIN: 8.8 G/DL (ref 13.5–17.5)
HEMOGLOBIN: 8.9 G/DL (ref 13.5–17.5)
HEMOGLOBIN: 8.9 G/DL (ref 13.5–17.5)
HEMOGLOBIN: 9.6 G/DL (ref 13.5–17.5)
HEMOGLOBIN: 9.6 G/DL (ref 13.5–17.5)
HEMOGLOBIN: 9.8 G/DL (ref 13.5–17.5)
HEMOGLOBIN: 9.9 G/DL (ref 13.5–17.5)
INR BLD: 1.2 (ref 0.86–1.14)
IRON SATURATION: 18 % (ref 20–50)
IRON: 17 UG/DL (ref 59–158)
LACTIC ACID: 1.6 MMOL/L (ref 0.4–2)
LIPASE: 6 U/L (ref 13–60)
LYMPHOCYTES ABSOLUTE: 0.2 K/UL (ref 1–5.1)
LYMPHOCYTES ABSOLUTE: 0.2 K/UL (ref 1–5.1)
LYMPHOCYTES ABSOLUTE: 0.4 K/UL (ref 1–5.1)
LYMPHOCYTES ABSOLUTE: 0.7 K/UL (ref 1–5.1)
LYMPHOCYTES ABSOLUTE: 0.7 K/UL (ref 1–5.1)
LYMPHOCYTES RELATIVE PERCENT: 3 %
LYMPHOCYTES RELATIVE PERCENT: 3.6 %
LYMPHOCYTES RELATIVE PERCENT: 3.8 %
LYMPHOCYTES RELATIVE PERCENT: 6 %
LYMPHOCYTES RELATIVE PERCENT: 7 %
MCH RBC QN AUTO: 30 PG (ref 26–34)
MCH RBC QN AUTO: 30.4 PG (ref 26–34)
MCH RBC QN AUTO: 30.4 PG (ref 26–34)
MCH RBC QN AUTO: 30.5 PG (ref 26–34)
MCH RBC QN AUTO: 31 PG (ref 26–34)
MCHC RBC AUTO-ENTMCNC: 32.5 G/DL (ref 31–36)
MCHC RBC AUTO-ENTMCNC: 32.8 G/DL (ref 31–36)
MCHC RBC AUTO-ENTMCNC: 32.8 G/DL (ref 31–36)
MCHC RBC AUTO-ENTMCNC: 32.9 G/DL (ref 31–36)
MCHC RBC AUTO-ENTMCNC: 32.9 G/DL (ref 31–36)
MCV RBC AUTO: 91.2 FL (ref 80–100)
MCV RBC AUTO: 92.6 FL (ref 80–100)
MCV RBC AUTO: 92.7 FL (ref 80–100)
MCV RBC AUTO: 93.5 FL (ref 80–100)
MCV RBC AUTO: 94.5 FL (ref 80–100)
METAMYELOCYTES RELATIVE PERCENT: 1 %
METHEMOGLOBIN ARTERIAL: 0 %
METHEMOGLOBIN ARTERIAL: 0.3 %
MONOCYTES ABSOLUTE: 0 K/UL (ref 0–1.3)
MONOCYTES ABSOLUTE: 0.1 K/UL (ref 0–1.3)
MONOCYTES ABSOLUTE: 0.2 K/UL (ref 0–1.3)
MONOCYTES ABSOLUTE: 0.3 K/UL (ref 0–1.3)
MONOCYTES ABSOLUTE: 0.5 K/UL (ref 0–1.3)
MONOCYTES RELATIVE PERCENT: 0 %
MONOCYTES RELATIVE PERCENT: 1 %
MONOCYTES RELATIVE PERCENT: 3 %
MONOCYTES RELATIVE PERCENT: 4.7 %
MONOCYTES RELATIVE PERCENT: 4.8 %
NEUTROPHILS ABSOLUTE: 10.4 K/UL (ref 1.7–7.7)
NEUTROPHILS ABSOLUTE: 5 K/UL (ref 1.7–7.7)
NEUTROPHILS ABSOLUTE: 5.8 K/UL (ref 1.7–7.7)
NEUTROPHILS ABSOLUTE: 8.6 K/UL (ref 1.7–7.7)
NEUTROPHILS ABSOLUTE: 9.6 K/UL (ref 1.7–7.7)
NEUTROPHILS RELATIVE PERCENT: 63 %
NEUTROPHILS RELATIVE PERCENT: 63 %
NEUTROPHILS RELATIVE PERCENT: 91 %
NEUTROPHILS RELATIVE PERCENT: 91.3 %
NEUTROPHILS RELATIVE PERCENT: 91.4 %
O2 CONTENT ARTERIAL: 12 ML/DL
O2 CONTENT ARTERIAL: 13 ML/DL
O2 CONTENT ARTERIAL: 14 ML/DL
O2 CONTENT ARTERIAL: 14 ML/DL
O2 SAT, ARTERIAL: 76.5 %
O2 SAT, ARTERIAL: 82.9 %
O2 SAT, ARTERIAL: 92.8 %
O2 SAT, ARTERIAL: 95.7 %
O2 THERAPY: ABNORMAL
OCCULT BLOOD DIAGNOSTIC: ABNORMAL
PCO2 ARTERIAL: 36.1 MMHG (ref 35–45)
PCO2 ARTERIAL: 44.7 MMHG (ref 35–45)
PCO2 ARTERIAL: 51.9 MMHG (ref 35–45)
PCO2 ARTERIAL: 59.1 MMHG (ref 35–45)
PDW BLD-RTO: 17.1 % (ref 12.4–15.4)
PDW BLD-RTO: 17.1 % (ref 12.4–15.4)
PDW BLD-RTO: 18.2 % (ref 12.4–15.4)
PDW BLD-RTO: 18.6 % (ref 12.4–15.4)
PDW BLD-RTO: 19 % (ref 12.4–15.4)
PERFORMED ON: ABNORMAL
PH ARTERIAL: 7.17 (ref 7.35–7.45)
PH ARTERIAL: 7.27 (ref 7.35–7.45)
PH ARTERIAL: 7.29 (ref 7.35–7.45)
PH ARTERIAL: 7.33 (ref 7.35–7.45)
PHOSPHORUS: 4.8 MG/DL (ref 2.5–4.9)
PHOSPHORUS: 4.9 MG/DL (ref 2.5–4.9)
PHOSPHORUS: 5.1 MG/DL (ref 2.5–4.9)
PHOSPHORUS: 6.8 MG/DL (ref 2.5–4.9)
PLATELET # BLD: 138 K/UL (ref 135–450)
PLATELET # BLD: 156 K/UL (ref 135–450)
PLATELET # BLD: 175 K/UL (ref 135–450)
PLATELET # BLD: 59 K/UL (ref 135–450)
PLATELET # BLD: 96 K/UL (ref 135–450)
PLATELET SLIDE REVIEW: ABNORMAL
PLATELET SLIDE REVIEW: ABNORMAL
PLATELET SLIDE REVIEW: ADEQUATE
PMV BLD AUTO: 8.5 FL (ref 5–10.5)
PMV BLD AUTO: 8.7 FL (ref 5–10.5)
PMV BLD AUTO: 8.8 FL (ref 5–10.5)
PMV BLD AUTO: 9 FL (ref 5–10.5)
PMV BLD AUTO: 9 FL (ref 5–10.5)
PO2 ARTERIAL: 46.6 MMHG (ref 75–108)
PO2 ARTERIAL: 52.5 MMHG (ref 75–108)
PO2 ARTERIAL: 69.2 MMHG (ref 75–108)
PO2 ARTERIAL: 99.7 MMHG (ref 75–108)
POIKILOCYTES: ABNORMAL
POTASSIUM REFLEX MAGNESIUM: 4.8 MMOL/L (ref 3.5–5.1)
POTASSIUM REFLEX MAGNESIUM: 5.3 MMOL/L (ref 3.5–5.1)
POTASSIUM REFLEX MAGNESIUM: 5.4 MMOL/L (ref 3.5–5.1)
POTASSIUM SERPL-SCNC: 4.5 MMOL/L (ref 3.5–5.1)
POTASSIUM SERPL-SCNC: 4.8 MMOL/L (ref 3.5–5.1)
POTASSIUM SERPL-SCNC: 5.1 MMOL/L (ref 3.5–5.1)
POTASSIUM SERPL-SCNC: 5.3 MMOL/L (ref 3.5–5.1)
POTASSIUM SERPL-SCNC: 5.6 MMOL/L (ref 3.5–5.1)
PRO-BNP: 911 PG/ML (ref 0–449)
PROTHROMBIN TIME: 13.9 SEC (ref 10–13.2)
RBC # BLD: 2.7 M/UL (ref 4.2–5.9)
RBC # BLD: 2.89 M/UL (ref 4.2–5.9)
RBC # BLD: 3.09 M/UL (ref 4.2–5.9)
RBC # BLD: 3.65 M/UL (ref 4.2–5.9)
RBC # BLD: 4.04 M/UL (ref 4.2–5.9)
SARS-COV-2, PCR: DETECTED
SLIDE REVIEW: ABNORMAL
SODIUM BLD-SCNC: 135 MMOL/L (ref 136–145)
SODIUM BLD-SCNC: 135 MMOL/L (ref 136–145)
SODIUM BLD-SCNC: 137 MMOL/L (ref 136–145)
SODIUM BLD-SCNC: 138 MMOL/L (ref 136–145)
SODIUM BLD-SCNC: 139 MMOL/L (ref 136–145)
TCO2 ARTERIAL: 19.6 MMOL/L
TCO2 ARTERIAL: 21.3 MMOL/L
TCO2 ARTERIAL: 23 MMOL/L
TCO2 ARTERIAL: 26 MMOL/L
TOTAL IRON BINDING CAPACITY: 95 UG/DL (ref 260–445)
TOTAL PROTEIN: 5.3 G/DL (ref 6.4–8.2)
TROPONIN: 0.02 NG/ML
TROPONIN: 0.03 NG/ML
TROPONIN: 0.03 NG/ML
WBC # BLD: 10.5 K/UL (ref 4–11)
WBC # BLD: 11.1 K/UL (ref 4–11)
WBC # BLD: 5.5 K/UL (ref 4–11)
WBC # BLD: 6.2 K/UL (ref 4–11)
WBC # BLD: 9.4 K/UL (ref 4–11)

## 2021-01-01 PROCEDURE — 6360000002 HC RX W HCPCS: Performed by: PHYSICIAN ASSISTANT

## 2021-01-01 PROCEDURE — C9113 INJ PANTOPRAZOLE SODIUM, VIA: HCPCS | Performed by: PHYSICIAN ASSISTANT

## 2021-01-01 PROCEDURE — 2580000003 HC RX 258: Performed by: EMERGENCY MEDICINE

## 2021-01-01 PROCEDURE — 2700000000 HC OXYGEN THERAPY PER DAY

## 2021-01-01 PROCEDURE — 83880 ASSAY OF NATRIURETIC PEPTIDE: CPT

## 2021-01-01 PROCEDURE — 94761 N-INVAS EAR/PLS OXIMETRY MLT: CPT

## 2021-01-01 PROCEDURE — 80053 COMPREHEN METABOLIC PANEL: CPT

## 2021-01-01 PROCEDURE — 99233 SBSQ HOSP IP/OBS HIGH 50: CPT | Performed by: INTERNAL MEDICINE

## 2021-01-01 PROCEDURE — 2500000003 HC RX 250 WO HCPCS: Performed by: HOSPITALIST

## 2021-01-01 PROCEDURE — 6360000002 HC RX W HCPCS: Performed by: INTERNAL MEDICINE

## 2021-01-01 PROCEDURE — 36600 WITHDRAWAL OF ARTERIAL BLOOD: CPT

## 2021-01-01 PROCEDURE — U0003 INFECTIOUS AGENT DETECTION BY NUCLEIC ACID (DNA OR RNA); SEVERE ACUTE RESPIRATORY SYNDROME CORONAVIRUS 2 (SARS-COV-2) (CORONAVIRUS DISEASE [COVID-19]), AMPLIFIED PROBE TECHNIQUE, MAKING USE OF HIGH THROUGHPUT TECHNOLOGIES AS DESCRIBED BY CMS-2020-01-R: HCPCS

## 2021-01-01 PROCEDURE — 2580000003 HC RX 258: Performed by: INTERNAL MEDICINE

## 2021-01-01 PROCEDURE — 71045 X-RAY EXAM CHEST 1 VIEW: CPT

## 2021-01-01 PROCEDURE — 2580000003 HC RX 258: Performed by: PHYSICIAN ASSISTANT

## 2021-01-01 PROCEDURE — 2000000000 HC ICU R&B

## 2021-01-01 PROCEDURE — G8484 FLU IMMUNIZE NO ADMIN: HCPCS | Performed by: FAMILY MEDICINE

## 2021-01-01 PROCEDURE — 85018 HEMOGLOBIN: CPT

## 2021-01-01 PROCEDURE — 2580000003 HC RX 258: Performed by: HOSPITALIST

## 2021-01-01 PROCEDURE — 80069 RENAL FUNCTION PANEL: CPT

## 2021-01-01 PROCEDURE — 2500000003 HC RX 250 WO HCPCS: Performed by: EMERGENCY MEDICINE

## 2021-01-01 PROCEDURE — 99232 SBSQ HOSP IP/OBS MODERATE 35: CPT | Performed by: INTERNAL MEDICINE

## 2021-01-01 PROCEDURE — 86850 RBC ANTIBODY SCREEN: CPT

## 2021-01-01 PROCEDURE — 96374 THER/PROPH/DIAG INJ IV PUSH: CPT

## 2021-01-01 PROCEDURE — 94660 CPAP INITIATION&MGMT: CPT

## 2021-01-01 PROCEDURE — XW13325 TRANSFUSION OF CONVALESCENT PLASMA (NONAUTOLOGOUS) INTO PERIPHERAL VEIN, PERCUTANEOUS APPROACH, NEW TECHNOLOGY GROUP 5: ICD-10-PCS | Performed by: INTERNAL MEDICINE

## 2021-01-01 PROCEDURE — 1250000000 HC SEMI PRIVATE HOSPICE R&B

## 2021-01-01 PROCEDURE — 6370000000 HC RX 637 (ALT 250 FOR IP): Performed by: PHYSICIAN ASSISTANT

## 2021-01-01 PROCEDURE — 80048 BASIC METABOLIC PNL TOTAL CA: CPT

## 2021-01-01 PROCEDURE — 85014 HEMATOCRIT: CPT

## 2021-01-01 PROCEDURE — 83605 ASSAY OF LACTIC ACID: CPT

## 2021-01-01 PROCEDURE — 86923 COMPATIBILITY TEST ELECTRIC: CPT

## 2021-01-01 PROCEDURE — 96375 TX/PRO/DX INJ NEW DRUG ADDON: CPT

## 2021-01-01 PROCEDURE — 6360000002 HC RX W HCPCS: Performed by: HOSPITALIST

## 2021-01-01 PROCEDURE — 6370000000 HC RX 637 (ALT 250 FOR IP): Performed by: INTERNAL MEDICINE

## 2021-01-01 PROCEDURE — 86901 BLOOD TYPING SEROLOGIC RH(D): CPT

## 2021-01-01 PROCEDURE — 2500000003 HC RX 250 WO HCPCS: Performed by: INTERNAL MEDICINE

## 2021-01-01 PROCEDURE — 74176 CT ABD & PELVIS W/O CONTRAST: CPT

## 2021-01-01 PROCEDURE — 99291 CRITICAL CARE FIRST HOUR: CPT | Performed by: INTERNAL MEDICINE

## 2021-01-01 PROCEDURE — 1123F ACP DISCUSS/DSCN MKR DOCD: CPT | Performed by: FAMILY MEDICINE

## 2021-01-01 PROCEDURE — 36556 INSERT NON-TUNNEL CV CATH: CPT

## 2021-01-01 PROCEDURE — 85025 COMPLETE CBC W/AUTO DIFF WBC: CPT

## 2021-01-01 PROCEDURE — 76937 US GUIDE VASCULAR ACCESS: CPT

## 2021-01-01 PROCEDURE — 83540 ASSAY OF IRON: CPT

## 2021-01-01 PROCEDURE — 77001 FLUOROGUIDE FOR VEIN DEVICE: CPT

## 2021-01-01 PROCEDURE — G0328 FECAL BLOOD SCRN IMMUNOASSAY: HCPCS

## 2021-01-01 PROCEDURE — 84484 ASSAY OF TROPONIN QUANT: CPT

## 2021-01-01 PROCEDURE — 83550 IRON BINDING TEST: CPT

## 2021-01-01 PROCEDURE — 99305 1ST NF CARE MODERATE MDM 35: CPT | Performed by: FAMILY MEDICINE

## 2021-01-01 PROCEDURE — 99291 CRITICAL CARE FIRST HOUR: CPT

## 2021-01-01 PROCEDURE — 82803 BLOOD GASES ANY COMBINATION: CPT

## 2021-01-01 PROCEDURE — 83690 ASSAY OF LIPASE: CPT

## 2021-01-01 PROCEDURE — 36415 COLL VENOUS BLD VENIPUNCTURE: CPT

## 2021-01-01 PROCEDURE — C1751 CATH, INF, PER/CENT/MIDLINE: HCPCS

## 2021-01-01 PROCEDURE — 1200000000 HC SEMI PRIVATE

## 2021-01-01 PROCEDURE — 93005 ELECTROCARDIOGRAM TRACING: CPT | Performed by: STUDENT IN AN ORGANIZED HEALTH CARE EDUCATION/TRAINING PROGRAM

## 2021-01-01 PROCEDURE — 36430 TRANSFUSION BLD/BLD COMPNT: CPT

## 2021-01-01 PROCEDURE — XW033E5 INTRODUCTION OF REMDESIVIR ANTI-INFECTIVE INTO PERIPHERAL VEIN, PERCUTANEOUS APPROACH, NEW TECHNOLOGY GROUP 5: ICD-10-PCS | Performed by: INTERNAL MEDICINE

## 2021-01-01 PROCEDURE — 99233 SBSQ HOSP IP/OBS HIGH 50: CPT | Performed by: NURSE PRACTITIONER

## 2021-01-01 PROCEDURE — 86900 BLOOD TYPING SEROLOGIC ABO: CPT

## 2021-01-01 PROCEDURE — 93010 ELECTROCARDIOGRAM REPORT: CPT | Performed by: INTERNAL MEDICINE

## 2021-01-01 PROCEDURE — 85730 THROMBOPLASTIN TIME PARTIAL: CPT

## 2021-01-01 PROCEDURE — 85610 PROTHROMBIN TIME: CPT

## 2021-01-01 PROCEDURE — P9016 RBC LEUKOCYTES REDUCED: HCPCS

## 2021-01-01 PROCEDURE — 05HM33Z INSERTION OF INFUSION DEVICE INTO RIGHT INTERNAL JUGULAR VEIN, PERCUTANEOUS APPROACH: ICD-10-PCS | Performed by: RADIOLOGY

## 2021-01-01 PROCEDURE — 99231 SBSQ HOSP IP/OBS SF/LOW 25: CPT | Performed by: INTERNAL MEDICINE

## 2021-01-01 RX ORDER — FERROUS SULFATE 325(65) MG
650 TABLET ORAL
Status: DISCONTINUED | OUTPATIENT
Start: 2021-01-01 | End: 2021-01-01 | Stop reason: HOSPADM

## 2021-01-01 RX ORDER — IPRATROPIUM BROMIDE AND ALBUTEROL SULFATE 2.5; .5 MG/3ML; MG/3ML
1 SOLUTION RESPIRATORY (INHALATION)
Status: DISCONTINUED | OUTPATIENT
Start: 2021-01-01 | End: 2021-01-01

## 2021-01-01 RX ORDER — MORPHINE SULFATE 20 MG/ML
5 SOLUTION ORAL
Status: DISCONTINUED | OUTPATIENT
Start: 2021-01-01 | End: 2021-01-20 | Stop reason: HOSPADM

## 2021-01-01 RX ORDER — 0.9 % SODIUM CHLORIDE 0.9 %
1000 INTRAVENOUS SOLUTION INTRAVENOUS ONCE
Status: COMPLETED | OUTPATIENT
Start: 2021-01-01 | End: 2021-01-01

## 2021-01-01 RX ORDER — ACETAMINOPHEN 325 MG/1
650 TABLET ORAL EVERY 6 HOURS PRN
Status: DISCONTINUED | OUTPATIENT
Start: 2021-01-01 | End: 2021-01-01 | Stop reason: HOSPADM

## 2021-01-01 RX ORDER — SODIUM CHLORIDE 0.9 % (FLUSH) 0.9 %
10 SYRINGE (ML) INJECTION EVERY 12 HOURS SCHEDULED
Status: DISCONTINUED | OUTPATIENT
Start: 2021-01-01 | End: 2021-01-01

## 2021-01-01 RX ORDER — DOCUSATE SODIUM 100 MG/1
100 CAPSULE, LIQUID FILLED ORAL 2 TIMES DAILY PRN
COMMUNITY

## 2021-01-01 RX ORDER — SODIUM CHLORIDE 0.9 % (FLUSH) 0.9 %
10 SYRINGE (ML) INJECTION EVERY 12 HOURS SCHEDULED
Status: DISCONTINUED | OUTPATIENT
Start: 2021-01-01 | End: 2021-01-01 | Stop reason: HOSPADM

## 2021-01-01 RX ORDER — LEVETIRACETAM 500 MG/1
1000 TABLET ORAL 2 TIMES DAILY
COMMUNITY

## 2021-01-01 RX ORDER — GABAPENTIN 400 MG/1
400 CAPSULE ORAL 3 TIMES DAILY
COMMUNITY

## 2021-01-01 RX ORDER — 0.9 % SODIUM CHLORIDE 0.9 %
250 INTRAVENOUS SOLUTION INTRAVENOUS ONCE
Status: COMPLETED | OUTPATIENT
Start: 2021-01-01 | End: 2021-01-01

## 2021-01-01 RX ORDER — PROMETHAZINE HYDROCHLORIDE 25 MG/1
25 TABLET ORAL EVERY 6 HOURS PRN
COMMUNITY

## 2021-01-01 RX ORDER — SODIUM CHLORIDE 9 MG/ML
10 INJECTION INTRAVENOUS EVERY 12 HOURS
Status: DISCONTINUED | OUTPATIENT
Start: 2021-01-01 | End: 2021-01-01 | Stop reason: SDUPTHER

## 2021-01-01 RX ORDER — GLYCOPYRROLATE 0.2 MG/ML
0.2 INJECTION INTRAMUSCULAR; INTRAVENOUS EVERY 4 HOURS PRN
Status: CANCELLED | OUTPATIENT
Start: 2021-01-01

## 2021-01-01 RX ORDER — GLYCOPYRROLATE 0.2 MG/ML
0.2 INJECTION INTRAMUSCULAR; INTRAVENOUS EVERY 4 HOURS PRN
Status: DISCONTINUED | OUTPATIENT
Start: 2021-01-01 | End: 2021-01-20 | Stop reason: HOSPADM

## 2021-01-01 RX ORDER — SODIUM CHLORIDE 0.9 % (FLUSH) 0.9 %
10 SYRINGE (ML) INJECTION PRN
Status: DISCONTINUED | OUTPATIENT
Start: 2021-01-01 | End: 2021-01-01

## 2021-01-01 RX ORDER — SODIUM CHLORIDE 9 MG/ML
INJECTION, SOLUTION INTRAVENOUS CONTINUOUS
Status: DISCONTINUED | OUTPATIENT
Start: 2021-01-01 | End: 2021-01-01

## 2021-01-01 RX ORDER — LOSARTAN POTASSIUM 25 MG/1
25 TABLET ORAL DAILY
COMMUNITY

## 2021-01-01 RX ORDER — LOPERAMIDE HYDROCHLORIDE 2 MG/1
2 CAPSULE ORAL 4 TIMES DAILY PRN
COMMUNITY

## 2021-01-01 RX ORDER — MORPHINE SULFATE 100 MG/5ML
5 SOLUTION ORAL
Status: CANCELLED | OUTPATIENT
Start: 2021-01-01

## 2021-01-01 RX ORDER — FERROUS SULFATE 325(65) MG
650 TABLET ORAL
COMMUNITY

## 2021-01-01 RX ORDER — 0.9 % SODIUM CHLORIDE 0.9 %
30 INTRAVENOUS SOLUTION INTRAVENOUS PRN
Status: DISCONTINUED | OUTPATIENT
Start: 2021-01-01 | End: 2021-01-01 | Stop reason: HOSPADM

## 2021-01-01 RX ORDER — CALCIUM POLYCARBOPHIL 625 MG 625 MG/1
625 TABLET ORAL DAILY
COMMUNITY

## 2021-01-01 RX ORDER — SODIUM CHLORIDE 9 MG/ML
INJECTION, SOLUTION INTRAVENOUS
Status: DISPENSED
Start: 2021-01-01 | End: 2021-01-01

## 2021-01-01 RX ORDER — FUROSEMIDE 10 MG/ML
40 INJECTION INTRAMUSCULAR; INTRAVENOUS ONCE
Status: COMPLETED | OUTPATIENT
Start: 2021-01-01 | End: 2021-01-01

## 2021-01-01 RX ORDER — SODIUM CHLORIDE 9 MG/ML
INJECTION, SOLUTION INTRAVENOUS PRN
Status: COMPLETED | OUTPATIENT
Start: 2021-01-01 | End: 2021-01-01

## 2021-01-01 RX ORDER — DEXAMETHASONE SODIUM PHOSPHATE 10 MG/ML
6 INJECTION, SOLUTION INTRAMUSCULAR; INTRAVENOUS DAILY
Status: DISCONTINUED | OUTPATIENT
Start: 2021-01-01 | End: 2021-01-01 | Stop reason: HOSPADM

## 2021-01-01 RX ORDER — LEVETIRACETAM 500 MG/1
1000 TABLET ORAL 2 TIMES DAILY
Status: DISCONTINUED | OUTPATIENT
Start: 2021-01-01 | End: 2021-01-01

## 2021-01-01 RX ORDER — BUSPIRONE HYDROCHLORIDE 7.5 MG/1
15 TABLET ORAL 2 TIMES DAILY
Status: DISCONTINUED | OUTPATIENT
Start: 2021-01-01 | End: 2021-01-01 | Stop reason: HOSPADM

## 2021-01-01 RX ORDER — CASTOR OIL AND BALSAM, PERU 788; 87 MG/G; MG/G
OINTMENT TOPICAL 2 TIMES DAILY
Status: DISCONTINUED | OUTPATIENT
Start: 2021-01-01 | End: 2021-01-01 | Stop reason: HOSPADM

## 2021-01-01 RX ORDER — IPRATROPIUM BROMIDE AND ALBUTEROL SULFATE 2.5; .5 MG/3ML; MG/3ML
1 SOLUTION RESPIRATORY (INHALATION) EVERY 4 HOURS PRN
Status: DISCONTINUED | OUTPATIENT
Start: 2021-01-01 | End: 2021-01-01 | Stop reason: HOSPADM

## 2021-01-01 RX ORDER — ACETAMINOPHEN 650 MG/1
650 SUPPOSITORY RECTAL EVERY 6 HOURS PRN
Status: DISCONTINUED | OUTPATIENT
Start: 2021-01-01 | End: 2021-01-01 | Stop reason: HOSPADM

## 2021-01-01 RX ORDER — BUPROPION HYDROCHLORIDE 300 MG/1
300 TABLET ORAL EVERY MORNING
Status: DISCONTINUED | OUTPATIENT
Start: 2021-01-01 | End: 2021-01-01 | Stop reason: HOSPADM

## 2021-01-01 RX ORDER — TRAMADOL HYDROCHLORIDE 50 MG/1
50 TABLET ORAL EVERY 6 HOURS PRN
COMMUNITY

## 2021-01-01 RX ORDER — CHOLECALCIFEROL (VITAMIN D3) 125 MCG
1000 CAPSULE ORAL DAILY
COMMUNITY

## 2021-01-01 RX ORDER — ALBUTEROL SULFATE 90 UG/1
2 AEROSOL, METERED RESPIRATORY (INHALATION) EVERY 6 HOURS PRN
Status: DISCONTINUED | OUTPATIENT
Start: 2021-01-01 | End: 2021-01-01 | Stop reason: HOSPADM

## 2021-01-01 RX ORDER — ONDANSETRON 2 MG/ML
4 INJECTION INTRAMUSCULAR; INTRAVENOUS EVERY 6 HOURS PRN
Status: DISCONTINUED | OUTPATIENT
Start: 2021-01-01 | End: 2021-01-01 | Stop reason: HOSPADM

## 2021-01-01 RX ORDER — FLUTICASONE FUROATE AND VILANTEROL TRIFENATATE 100; 25 UG/1; UG/1
POWDER RESPIRATORY (INHALATION) DAILY
COMMUNITY

## 2021-01-01 RX ORDER — FUROSEMIDE 20 MG/1
10 TABLET ORAL DAILY
COMMUNITY

## 2021-01-01 RX ORDER — HALOPERIDOL 5 MG/ML
2 INJECTION INTRAMUSCULAR ONCE
Status: COMPLETED | OUTPATIENT
Start: 2021-01-01 | End: 2021-01-01

## 2021-01-01 RX ORDER — DOXAZOSIN MESYLATE 4 MG/1
4 TABLET ORAL DAILY
COMMUNITY

## 2021-01-01 RX ORDER — METHYLPREDNISOLONE SODIUM SUCCINATE 125 MG/2ML
60 INJECTION, POWDER, LYOPHILIZED, FOR SOLUTION INTRAMUSCULAR; INTRAVENOUS EVERY 12 HOURS
Status: DISCONTINUED | OUTPATIENT
Start: 2021-01-01 | End: 2021-01-01

## 2021-01-01 RX ORDER — DEXAMETHASONE SODIUM PHOSPHATE 10 MG/ML
10 INJECTION, SOLUTION INTRAMUSCULAR; INTRAVENOUS ONCE
Status: COMPLETED | OUTPATIENT
Start: 2021-01-01 | End: 2021-01-01

## 2021-01-01 RX ORDER — PANTOPRAZOLE SODIUM 40 MG/10ML
40 INJECTION, POWDER, LYOPHILIZED, FOR SOLUTION INTRAVENOUS ONCE
Status: COMPLETED | OUTPATIENT
Start: 2021-01-01 | End: 2021-01-01

## 2021-01-01 RX ORDER — LIDOCAINE HYDROCHLORIDE 10 MG/ML
5 INJECTION, SOLUTION INFILTRATION; PERINEURAL ONCE
Status: DISCONTINUED | OUTPATIENT
Start: 2021-01-01 | End: 2021-01-01

## 2021-01-01 RX ORDER — METHYLPREDNISOLONE SODIUM SUCCINATE 125 MG/2ML
60 INJECTION, POWDER, LYOPHILIZED, FOR SOLUTION INTRAMUSCULAR; INTRAVENOUS EVERY 6 HOURS
Status: DISCONTINUED | OUTPATIENT
Start: 2021-01-01 | End: 2021-01-01

## 2021-01-01 RX ORDER — DEXAMETHASONE SODIUM PHOSPHATE 10 MG/ML
6 INJECTION, SOLUTION INTRAMUSCULAR; INTRAVENOUS EVERY 6 HOURS
Status: DISCONTINUED | OUTPATIENT
Start: 2021-01-01 | End: 2021-01-01

## 2021-01-01 RX ORDER — AMIODARONE HYDROCHLORIDE 200 MG/1
200 TABLET ORAL DAILY
Status: DISCONTINUED | OUTPATIENT
Start: 2021-01-01 | End: 2021-01-01 | Stop reason: HOSPADM

## 2021-01-01 RX ORDER — SODIUM CHLORIDE 0.9 % (FLUSH) 0.9 %
10 SYRINGE (ML) INJECTION PRN
Status: DISCONTINUED | OUTPATIENT
Start: 2021-01-01 | End: 2021-01-01 | Stop reason: HOSPADM

## 2021-01-01 RX ORDER — BUPROPION HYDROCHLORIDE 300 MG/1
300 TABLET ORAL EVERY MORNING
COMMUNITY

## 2021-01-01 RX ORDER — LORAZEPAM 2 MG/ML
1 CONCENTRATE ORAL
Status: DISCONTINUED | OUTPATIENT
Start: 2021-01-01 | End: 2021-01-20 | Stop reason: HOSPADM

## 2021-01-01 RX ORDER — PANTOPRAZOLE SODIUM 40 MG/10ML
40 INJECTION, POWDER, LYOPHILIZED, FOR SOLUTION INTRAVENOUS EVERY 12 HOURS
Status: DISCONTINUED | OUTPATIENT
Start: 2021-01-01 | End: 2021-01-01 | Stop reason: HOSPADM

## 2021-01-01 RX ORDER — M-VIT,TX,IRON,MINS/CALC/FOLIC 27MG-0.4MG
1 TABLET ORAL DAILY
COMMUNITY

## 2021-01-01 RX ORDER — IPRATROPIUM BROMIDE AND ALBUTEROL SULFATE 2.5; .5 MG/3ML; MG/3ML
1 SOLUTION RESPIRATORY (INHALATION) ONCE
Status: COMPLETED | OUTPATIENT
Start: 2021-01-01 | End: 2021-01-01

## 2021-01-01 RX ORDER — ONDANSETRON 2 MG/ML
4 INJECTION INTRAMUSCULAR; INTRAVENOUS ONCE
Status: COMPLETED | OUTPATIENT
Start: 2021-01-01 | End: 2021-01-01

## 2021-01-01 RX ORDER — LORAZEPAM 2 MG/ML
1 CONCENTRATE ORAL
Status: CANCELLED | OUTPATIENT
Start: 2021-01-01

## 2021-01-01 RX ORDER — POTASSIUM CHLORIDE 750 MG/1
10 CAPSULE, EXTENDED RELEASE ORAL DAILY
COMMUNITY

## 2021-01-01 RX ORDER — TAMSULOSIN HYDROCHLORIDE 0.4 MG/1
0.4 CAPSULE ORAL NIGHTLY
Status: DISCONTINUED | OUTPATIENT
Start: 2021-01-01 | End: 2021-01-01 | Stop reason: HOSPADM

## 2021-01-01 RX ORDER — PROMETHAZINE HYDROCHLORIDE 25 MG/1
12.5 TABLET ORAL EVERY 6 HOURS PRN
Status: DISCONTINUED | OUTPATIENT
Start: 2021-01-01 | End: 2021-01-01 | Stop reason: HOSPADM

## 2021-01-01 RX ORDER — ONDANSETRON 4 MG/1
4 TABLET, FILM COATED ORAL EVERY 8 HOURS PRN
COMMUNITY

## 2021-01-01 RX ADMIN — DEXAMETHASONE SODIUM PHOSPHATE 6 MG: 10 INJECTION, SOLUTION INTRAMUSCULAR; INTRAVENOUS at 08:37

## 2021-01-01 RX ADMIN — VASOPRESSIN 0.04 UNITS/MIN: 20 INJECTION INTRAVENOUS at 12:53

## 2021-01-01 RX ADMIN — MEROPENEM 1 G: 1 INJECTION, POWDER, FOR SOLUTION INTRAVENOUS at 17:01

## 2021-01-01 RX ADMIN — DEXAMETHASONE SODIUM PHOSPHATE 6 MG: 10 INJECTION, SOLUTION INTRAMUSCULAR; INTRAVENOUS at 09:06

## 2021-01-01 RX ADMIN — CASTOR OIL AND BALSAM, PERU: 788; 87 OINTMENT TOPICAL at 08:28

## 2021-01-01 RX ADMIN — CEFEPIME HYDROCHLORIDE 1 G: 1 INJECTION, POWDER, FOR SOLUTION INTRAMUSCULAR; INTRAVENOUS at 12:53

## 2021-01-01 RX ADMIN — CASTOR OIL AND BALSAM, PERU: 788; 87 OINTMENT TOPICAL at 00:23

## 2021-01-01 RX ADMIN — CEFEPIME HYDROCHLORIDE 1 G: 1 INJECTION, POWDER, FOR SOLUTION INTRAMUSCULAR; INTRAVENOUS at 11:43

## 2021-01-01 RX ADMIN — CASTOR OIL AND BALSAM, PERU: 788; 87 OINTMENT TOPICAL at 08:37

## 2021-01-01 RX ADMIN — Medication 10 ML: at 22:42

## 2021-01-01 RX ADMIN — IPRATROPIUM BROMIDE AND ALBUTEROL SULFATE 1 AMPULE: .5; 3 SOLUTION RESPIRATORY (INHALATION) at 18:50

## 2021-01-01 RX ADMIN — Medication 10 ML: at 22:07

## 2021-01-01 RX ADMIN — OCTREOTIDE ACETATE 25 MCG/HR: 500 INJECTION, SOLUTION INTRAVENOUS; SUBCUTANEOUS at 19:00

## 2021-01-01 RX ADMIN — VASOPRESSIN 0.04 UNITS/MIN: 20 INJECTION INTRAVENOUS at 09:52

## 2021-01-01 RX ADMIN — SODIUM CHLORIDE: 9 INJECTION, SOLUTION INTRAVENOUS at 02:20

## 2021-01-01 RX ADMIN — CEFEPIME HYDROCHLORIDE 1 G: 1 INJECTION, POWDER, FOR SOLUTION INTRAMUSCULAR; INTRAVENOUS at 22:00

## 2021-01-01 RX ADMIN — LEVETIRACETAM 500 MG: 100 INJECTION, SOLUTION INTRAVENOUS at 14:53

## 2021-01-01 RX ADMIN — LEVETIRACETAM 500 MG: 100 INJECTION, SOLUTION INTRAVENOUS at 14:24

## 2021-01-01 RX ADMIN — SODIUM CHLORIDE: 9 INJECTION, SOLUTION INTRAVENOUS at 17:16

## 2021-01-01 RX ADMIN — OCTREOTIDE ACETATE 25 MCG/HR: 500 INJECTION, SOLUTION INTRAVENOUS; SUBCUTANEOUS at 09:15

## 2021-01-01 RX ADMIN — DEXAMETHASONE SODIUM PHOSPHATE 6 MG: 10 INJECTION, SOLUTION INTRAMUSCULAR; INTRAVENOUS at 14:53

## 2021-01-01 RX ADMIN — ONDANSETRON HYDROCHLORIDE 4 MG: 2 INJECTION, SOLUTION INTRAMUSCULAR; INTRAVENOUS at 15:40

## 2021-01-01 RX ADMIN — Medication 10 ML: at 23:37

## 2021-01-01 RX ADMIN — Medication 10 ML: at 00:22

## 2021-01-01 RX ADMIN — Medication 10 ML: at 22:00

## 2021-01-01 RX ADMIN — DEXAMETHASONE SODIUM PHOSPHATE 6 MG: 10 INJECTION, SOLUTION INTRAMUSCULAR; INTRAVENOUS at 02:25

## 2021-01-01 RX ADMIN — PANTOPRAZOLE SODIUM 40 MG: 40 INJECTION, POWDER, FOR SOLUTION INTRAVENOUS at 11:01

## 2021-01-01 RX ADMIN — CEFEPIME HYDROCHLORIDE 1 G: 1 INJECTION, POWDER, FOR SOLUTION INTRAMUSCULAR; INTRAVENOUS at 23:43

## 2021-01-01 RX ADMIN — Medication 10 ML: at 10:21

## 2021-01-01 RX ADMIN — DEXAMETHASONE SODIUM PHOSPHATE 6 MG: 10 INJECTION, SOLUTION INTRAMUSCULAR; INTRAVENOUS at 08:03

## 2021-01-01 RX ADMIN — Medication 10 ML: at 21:00

## 2021-01-01 RX ADMIN — SODIUM CHLORIDE: 9 INJECTION, SOLUTION INTRAVENOUS at 22:44

## 2021-01-01 RX ADMIN — LEVETIRACETAM 500 MG: 100 INJECTION, SOLUTION INTRAVENOUS at 13:58

## 2021-01-01 RX ADMIN — LEVETIRACETAM 500 MG: 100 INJECTION, SOLUTION INTRAVENOUS at 02:32

## 2021-01-01 RX ADMIN — PANTOPRAZOLE SODIUM 40 MG: 40 INJECTION, POWDER, FOR SOLUTION INTRAVENOUS at 22:02

## 2021-01-01 RX ADMIN — NOREPINEPHRINE BITARTRATE 2 MCG/MIN: 1 INJECTION INTRAVENOUS at 18:36

## 2021-01-01 RX ADMIN — LEVETIRACETAM 500 MG: 100 INJECTION, SOLUTION INTRAVENOUS at 15:07

## 2021-01-01 RX ADMIN — SODIUM BICARBONATE: 84 INJECTION, SOLUTION INTRAVENOUS at 22:02

## 2021-01-01 RX ADMIN — DEXAMETHASONE SODIUM PHOSPHATE 6 MG: 10 INJECTION, SOLUTION INTRAMUSCULAR; INTRAVENOUS at 20:24

## 2021-01-01 RX ADMIN — PANTOPRAZOLE SODIUM 40 MG: 40 INJECTION, POWDER, FOR SOLUTION INTRAVENOUS at 00:17

## 2021-01-01 RX ADMIN — CEFTRIAXONE 2 G: 2 INJECTION, POWDER, FOR SOLUTION INTRAMUSCULAR; INTRAVENOUS at 21:34

## 2021-01-01 RX ADMIN — HALOPERIDOL LACTATE 2 MG: 5 INJECTION, SOLUTION INTRAMUSCULAR at 20:24

## 2021-01-01 RX ADMIN — SODIUM CHLORIDE 1000 ML: 9 INJECTION, SOLUTION INTRAVENOUS at 16:50

## 2021-01-01 RX ADMIN — SODIUM CHLORIDE 1000 ML: 9 INJECTION, SOLUTION INTRAVENOUS at 12:51

## 2021-01-01 RX ADMIN — SODIUM CHLORIDE 250 ML: 9 INJECTION, SOLUTION INTRAVENOUS at 21:37

## 2021-01-01 RX ADMIN — PANTOPRAZOLE SODIUM 40 MG: 40 INJECTION, POWDER, FOR SOLUTION INTRAVENOUS at 09:10

## 2021-01-01 RX ADMIN — CASTOR OIL AND BALSAM, PERU: 788; 87 OINTMENT TOPICAL at 20:06

## 2021-01-01 RX ADMIN — SODIUM BICARBONATE: 84 INJECTION, SOLUTION INTRAVENOUS at 11:44

## 2021-01-01 RX ADMIN — PANTOPRAZOLE SODIUM 40 MG: 40 INJECTION, POWDER, FOR SOLUTION INTRAVENOUS at 12:50

## 2021-01-01 RX ADMIN — Medication 10 ML: at 08:03

## 2021-01-01 RX ADMIN — SODIUM CHLORIDE 250 ML: 9 INJECTION, SOLUTION INTRAVENOUS at 21:35

## 2021-01-01 RX ADMIN — LEVETIRACETAM 500 MG: 100 INJECTION, SOLUTION INTRAVENOUS at 03:52

## 2021-01-01 RX ADMIN — CEFEPIME HYDROCHLORIDE 1 G: 1 INJECTION, POWDER, FOR SOLUTION INTRAMUSCULAR; INTRAVENOUS at 00:20

## 2021-01-01 RX ADMIN — VASOPRESSIN 0.04 UNITS/MIN: 20 INJECTION INTRAVENOUS at 02:03

## 2021-01-01 RX ADMIN — PANTOPRAZOLE SODIUM 40 MG: 40 INJECTION, POWDER, FOR SOLUTION INTRAVENOUS at 10:56

## 2021-01-01 RX ADMIN — CEFEPIME HYDROCHLORIDE 1 G: 1 INJECTION, POWDER, FOR SOLUTION INTRAMUSCULAR; INTRAVENOUS at 23:12

## 2021-01-01 RX ADMIN — FUROSEMIDE 40 MG: 10 INJECTION, SOLUTION INTRAMUSCULAR; INTRAVENOUS at 14:13

## 2021-01-01 RX ADMIN — Medication 10 ML: at 00:23

## 2021-01-01 RX ADMIN — CASTOR OIL AND BALSAM, PERU: 788; 87 OINTMENT TOPICAL at 22:41

## 2021-01-01 RX ADMIN — Medication 10 ML: at 23:46

## 2021-01-01 RX ADMIN — Medication 10 ML: at 08:47

## 2021-01-01 RX ADMIN — SODIUM BICARBONATE: 84 INJECTION, SOLUTION INTRAVENOUS at 08:34

## 2021-01-01 RX ADMIN — VASOPRESSIN 0.04 UNITS/MIN: 20 INJECTION INTRAVENOUS at 08:26

## 2021-01-01 RX ADMIN — OCTREOTIDE ACETATE 25 MCG/HR: 500 INJECTION, SOLUTION INTRAVENOUS; SUBCUTANEOUS at 06:14

## 2021-01-01 RX ADMIN — Medication 10 ML: at 09:15

## 2021-01-01 RX ADMIN — REMDESIVIR 200 MG: 100 INJECTION, POWDER, LYOPHILIZED, FOR SOLUTION INTRAVENOUS at 09:14

## 2021-01-01 RX ADMIN — DEXAMETHASONE SODIUM PHOSPHATE 6 MG: 10 INJECTION, SOLUTION INTRAMUSCULAR; INTRAVENOUS at 03:39

## 2021-01-01 RX ADMIN — LEVETIRACETAM 500 MG: 100 INJECTION, SOLUTION INTRAVENOUS at 04:02

## 2021-01-01 RX ADMIN — ONDANSETRON HYDROCHLORIDE 4 MG: 2 INJECTION, SOLUTION INTRAMUSCULAR; INTRAVENOUS at 12:50

## 2021-01-01 RX ADMIN — CEFEPIME HYDROCHLORIDE 1 G: 1 INJECTION, POWDER, FOR SOLUTION INTRAMUSCULAR; INTRAVENOUS at 10:56

## 2021-01-01 RX ADMIN — DEXAMETHASONE SODIUM PHOSPHATE 6 MG: 10 INJECTION, SOLUTION INTRAMUSCULAR; INTRAVENOUS at 20:06

## 2021-01-01 RX ADMIN — PANTOPRAZOLE SODIUM 40 MG: 40 INJECTION, POWDER, FOR SOLUTION INTRAVENOUS at 12:53

## 2021-01-01 RX ADMIN — CEFEPIME HYDROCHLORIDE 1 G: 1 INJECTION, POWDER, FOR SOLUTION INTRAMUSCULAR; INTRAVENOUS at 22:41

## 2021-01-01 RX ADMIN — SODIUM BICARBONATE: 84 INJECTION, SOLUTION INTRAVENOUS at 01:40

## 2021-01-01 RX ADMIN — Medication 10 ML: at 23:12

## 2021-01-01 RX ADMIN — Medication 10 ML: at 10:22

## 2021-01-01 RX ADMIN — CASTOR OIL AND BALSAM, PERU: 788; 87 OINTMENT TOPICAL at 09:25

## 2021-01-01 RX ADMIN — VANCOMYCIN HYDROCHLORIDE 1250 MG: 10 INJECTION, POWDER, LYOPHILIZED, FOR SOLUTION INTRAVENOUS at 16:50

## 2021-01-01 RX ADMIN — VASOPRESSIN 0.04 UNITS/MIN: 20 INJECTION INTRAVENOUS at 17:25

## 2021-01-01 RX ADMIN — Medication 10 ML: at 08:37

## 2021-01-01 RX ADMIN — SODIUM CHLORIDE 30 ML: 9 INJECTION, SOLUTION INTRAVENOUS at 11:02

## 2021-01-01 RX ADMIN — CEFEPIME HYDROCHLORIDE 1 G: 1 INJECTION, POWDER, FOR SOLUTION INTRAMUSCULAR; INTRAVENOUS at 10:00

## 2021-01-01 RX ADMIN — SODIUM CHLORIDE: 9 INJECTION, SOLUTION INTRAVENOUS at 08:43

## 2021-01-01 RX ADMIN — NOREPINEPHRINE BITARTRATE 4 MCG/MIN: 1 INJECTION INTRAVENOUS at 18:40

## 2021-01-01 RX ADMIN — NOREPINEPHRINE BITARTRATE 6 MCG/MIN: 1 INJECTION INTRAVENOUS at 18:52

## 2021-01-01 RX ADMIN — DEXAMETHASONE SODIUM PHOSPHATE 6 MG: 10 INJECTION, SOLUTION INTRAMUSCULAR; INTRAVENOUS at 09:15

## 2021-01-01 RX ADMIN — DEXAMETHASONE SODIUM PHOSPHATE 6 MG: 10 INJECTION, SOLUTION INTRAMUSCULAR; INTRAVENOUS at 13:25

## 2021-01-01 RX ADMIN — PANTOPRAZOLE SODIUM 40 MG: 40 INJECTION, POWDER, FOR SOLUTION INTRAVENOUS at 23:12

## 2021-01-01 RX ADMIN — METHYLPREDNISOLONE SODIUM SUCCINATE 60 MG: 125 INJECTION, POWDER, FOR SOLUTION INTRAMUSCULAR; INTRAVENOUS at 05:23

## 2021-01-01 RX ADMIN — PANTOPRAZOLE SODIUM 40 MG: 40 INJECTION, POWDER, FOR SOLUTION INTRAVENOUS at 22:41

## 2021-01-01 RX ADMIN — NOREPINEPHRINE BITARTRATE 8 MCG/MIN: 1 INJECTION INTRAVENOUS at 19:01

## 2021-01-01 RX ADMIN — CASTOR OIL AND BALSAM, PERU: 788; 87 OINTMENT TOPICAL at 22:02

## 2021-01-01 RX ADMIN — SODIUM BICARBONATE: 84 INJECTION, SOLUTION INTRAVENOUS at 14:52

## 2021-01-01 RX ADMIN — LEVETIRACETAM 1000 MG: 100 INJECTION, SOLUTION INTRAVENOUS at 14:52

## 2021-01-01 RX ADMIN — PANTOPRAZOLE SODIUM 40 MG: 40 INJECTION, POWDER, FOR SOLUTION INTRAVENOUS at 00:21

## 2021-01-01 RX ADMIN — CASTOR OIL AND BALSAM, PERU: 788; 87 OINTMENT TOPICAL at 09:16

## 2021-01-01 RX ADMIN — DEXAMETHASONE SODIUM PHOSPHATE 10 MG: 10 INJECTION, SOLUTION INTRAMUSCULAR; INTRAVENOUS at 18:50

## 2021-01-01 RX ADMIN — Medication 10 ML: at 20:05

## 2021-01-01 RX ADMIN — VASOPRESSIN 0.04 UNITS/MIN: 20 INJECTION INTRAVENOUS at 01:29

## 2021-01-01 RX ADMIN — NOREPINEPHRINE BITARTRATE 23 MCG/MIN: 1 INJECTION INTRAVENOUS at 09:15

## 2021-01-01 RX ADMIN — LEVETIRACETAM 500 MG: 100 INJECTION, SOLUTION INTRAVENOUS at 03:39

## 2021-01-01 RX ADMIN — PANTOPRAZOLE SODIUM 40 MG: 40 INJECTION, POWDER, FOR SOLUTION INTRAVENOUS at 08:46

## 2021-01-01 RX ADMIN — CEFEPIME HYDROCHLORIDE 1 G: 1 INJECTION, POWDER, FOR SOLUTION INTRAMUSCULAR; INTRAVENOUS at 11:30

## 2021-01-01 ASSESSMENT — PAIN SCALES - PAIN ASSESSMENT IN ADVANCED DEMENTIA (PAINAD)
TOTALSCORE: 0
CONSOLABILITY: 0
CONSOLABILITY: 0
FACIALEXPRESSION: 0
NEGVOCALIZATION: 0
NEGVOCALIZATION: 0
BODYLANGUAGE: 0
BREATHING: 0
BREATHING: 1
FACIALEXPRESSION: 0
BREATHING: 0
BODYLANGUAGE: 0
TOTALSCORE: 1
BODYLANGUAGE: 0
CONSOLABILITY: 0
TOTALSCORE: 0
CONSOLABILITY: 0
BREATHING: 0
NEGVOCALIZATION: 0
BREATHING: 0
BREATHING: 0
CONSOLABILITY: 0
BODYLANGUAGE: 0
FACIALEXPRESSION: 0
FACIALEXPRESSION: 0
BREATHING: 0
FACIALEXPRESSION: 0
BODYLANGUAGE: 0
BODYLANGUAGE: 0
FACIALEXPRESSION: 0
BREATHING: 0
BREATHING: 0
CONSOLABILITY: 0
BODYLANGUAGE: 0
NEGVOCALIZATION: 0
CONSOLABILITY: 0
NEGVOCALIZATION: 1
BREATHING: 0
BREATHING: 0
CONSOLABILITY: 0
CONSOLABILITY: 0
BODYLANGUAGE: 0
FACIALEXPRESSION: 0
BODYLANGUAGE: 0
BREATHING: 0
NEGVOCALIZATION: 0
TOTALSCORE: 0
CONSOLABILITY: 0
BREATHING: 0
BODYLANGUAGE: 0
BODYLANGUAGE: 0
BREATHING: 1
TOTALSCORE: 1
BODYLANGUAGE: 0
NEGVOCALIZATION: 0
NEGVOCALIZATION: 0
BODYLANGUAGE: 0
BODYLANGUAGE: 0
TOTALSCORE: 0
FACIALEXPRESSION: 0
CONSOLABILITY: 0
TOTALSCORE: 0
NEGVOCALIZATION: 1
FACIALEXPRESSION: 0
FACIALEXPRESSION: 0
TOTALSCORE: 0
BODYLANGUAGE: 0
BODYLANGUAGE: 0
CONSOLABILITY: 0
CONSOLABILITY: 0
TOTALSCORE: 0
CONSOLABILITY: 0
NEGVOCALIZATION: 0
NEGVOCALIZATION: 0
FACIALEXPRESSION: 0
CONSOLABILITY: 0
BREATHING: 0
FACIALEXPRESSION: 0
BODYLANGUAGE: 0
NEGVOCALIZATION: 0
CONSOLABILITY: 0
FACIALEXPRESSION: 0
NEGVOCALIZATION: 0
CONSOLABILITY: 0
FACIALEXPRESSION: 0
FACIALEXPRESSION: 0
BREATHING: 1
CONSOLABILITY: 0
FACIALEXPRESSION: 0
BODYLANGUAGE: 0
CONSOLABILITY: 0
BREATHING: 0
FACIALEXPRESSION: 0
CONSOLABILITY: 0
TOTALSCORE: 0
NEGVOCALIZATION: 0
TOTALSCORE: 0
BODYLANGUAGE: 0
NEGVOCALIZATION: 0
BREATHING: 0
NEGVOCALIZATION: 0
BODYLANGUAGE: 0
NEGVOCALIZATION: 0
BREATHING: 1
BREATHING: 0
FACIALEXPRESSION: 0
TOTALSCORE: 0
NEGVOCALIZATION: 0
BODYLANGUAGE: 0
CONSOLABILITY: 0
TOTALSCORE: 1
CONSOLABILITY: 0
BREATHING: 0
CONSOLABILITY: 0
BREATHING: 0
FACIALEXPRESSION: 0
TOTALSCORE: 1
NEGVOCALIZATION: 0
BODYLANGUAGE: 0
BREATHING: 1
CONSOLABILITY: 0
BREATHING: 0
NEGVOCALIZATION: 0
FACIALEXPRESSION: 0
TOTALSCORE: 0
FACIALEXPRESSION: 0
NEGVOCALIZATION: 0
TOTALSCORE: 0
BODYLANGUAGE: 0
CONSOLABILITY: 0
FACIALEXPRESSION: 0
NEGVOCALIZATION: 0
CONSOLABILITY: 0
BREATHING: 1
TOTALSCORE: 0
BREATHING: 0
FACIALEXPRESSION: 0
CONSOLABILITY: 0
FACIALEXPRESSION: 0
NEGVOCALIZATION: 1

## 2021-01-01 ASSESSMENT — PAIN SCALES - GENERAL
PAINLEVEL_OUTOF10: 0
PAINLEVEL_OUTOF10: 5
PAINLEVEL_OUTOF10: 2
PAINLEVEL_OUTOF10: 1
PAINLEVEL_OUTOF10: 0

## 2021-01-01 ASSESSMENT — ENCOUNTER SYMPTOMS
ABDOMINAL PAIN: 1
SHORTNESS OF BREATH: 0
DIARRHEA: 1
COUGH: 1
NAUSEA: 1
VOMITING: 1

## 2021-01-04 NOTE — PROGRESS NOTES
[x] Abnormal-diminished movement of the right upper and right lower extremity    Neurological:        [x] No Facial Asymmetry (Cranial nerve 7 motor function) (limited exam to video visit)          [x] No gaze palsy        [] Abnormal-         Skin:        [x] No significant exanthematous lesions or discoloration noted on facial skin         [] Abnormal-            Psychiatric:       [x] Normal Affect [x] No Hallucinations        [] Abnormal-     Other pertinent observable physical exam findings-the nurse assisting with the virtual visit mentioned on examination of the foreskin that there are ulcers in area. Patient was very uncomfortable when these areas were manipulated examined     ASSESSMENT PLAN      Diagnosis Orders   1. Cerebrovascular accident (CVA), unspecified mechanism ([facility]     2. Urinary retention     3. Prostate cancer ([facility]     4. Mucopurulent chronic bronchitis ([facility]     5. Balanitis     We will ask for wound care nurse to look at the patient's foreskin. For now leave the Pappas catheter in place due to chronic urinary obstruction and the inflammation of the foreskin/head of the penis. It is more likely that he will return main long-term care. Patient should call the office immediately with new or ongoing signs or symptoms or worsening, or proceed to the emergency room. No changes in past medical history, past surgical history, social history, or family history were noted during the patient encounter unless specifically listed above. All updates of past medical history, past surgical history, social history, or family history were reviewed personally by me during the office visit. All problems listed in the assessment are stable unless noted otherwise. Medication profile reviewed personally by me during the visit. Medication side effects and possible impairments from medications were discussed as applicable. This document was prepared by a combination of typing and transcription through a voice recognition software. Ibrahima Corea is a 81 y.o. male being evaluated by a Virtual Visit (video visit) encounter to address concerns as mentioned above. A caregiver was present when appropriate. Due to this being a TeleHealth encounter (During RIGAU-91 public health emergency), evaluation of the following organ systems was limited: Vitals/Constitutional/EENT/Resp/CV/GI//MS/Neuro/Skin/Heme-Lymph-Imm. Pursuant to the emergency declaration under the [address] [place] [address], [address] [place] [address] authority and the [employer] Act, this Virtual Visit was conducted with patient's (and/or legal guardian's) consent, to reduce the patient's risk of exposure to COVID-19 and provide necessary medical care. The patient (and/or legal guardian) has also been advised to contact this office for worsening conditions or problems, and seek emergency medical treatment and/or call 911 if deemed necessary. Services were provided through a video synchronous discussion virtually to substitute for in-person clinic visit. Patient and provider were located at their individual homes. --Dorcas Lennox, MD on 1/4/2021 at 11:22 AM    An electronic signature was used to authenticate this note.

## 2021-01-14 PROBLEM — K92.2 GI BLEED: Status: ACTIVE | Noted: 2021-01-01

## 2021-01-14 NOTE — PLAN OF CARE
Admit to ICU, tele    COVID R/o - comes from SNF  GI bleed - Hgb improved from prior in 11/2020  Hypotension  Acute Hypoxic RF - initially on RA > 6L > vapotherm  - aspirated in the ED  CORA, Hyperkalemia  Mildly elevated Troponin - 0.03    Critical care consult, GI consult, Vanc and Cefepime, NPO, SLP eval, COVID by PCR, IVF, PPI, repeat BMP, trend troponins, tele      Addendum: 47  - spoke with ER PA Benedicto Rubalcava who spoke with family and POA - family would like pt to be a DNR-CC  - pt is currently on levophed and on vapotherm - will continue with placement in ICU

## 2021-01-14 NOTE — ED PROVIDER NOTES
[address] ED  EMERGENCY DEPARTMENT ENCOUNTER        Pt Name: Ethan Burgos  MRN: [id number]  [place] 1935  Date of evaluation: 1/14/2021  Provider: Krystian Cerda PA-C  PCP: [facility]    Shared Visit or Autonomous Visit:  I have seen and evaluated this patient with my supervising physician Victor Hugo Barillas MD.    [facility]       Chief Complaint   Patient presents with    Emesis     Patient arrives via EMS fro [facility], for 2 episodes of coffee ground emesis. HISTORY OF PRESENT ILLNESS   (Location/Symptom, Timing/Onset, Context/Setting, Quality, Duration, Modifying Factors, Severity)  Note limiting factors. Ethan Burgos is a 81 y.o. male with past medical history of dementia, COPD, heart failure, CAD, CVA affecting the right side, paroxysmal atrial fibrillation, hypertension, GERD, COVID 19 positive 1/1/2021. Arrived from [facility] by EMS for coffee ground emesis. Patient stating he's had black stools the past 2 days. Not on blood thinners. Nausea. Abdominal pain. Cough. No fever. Denies feeling short of breath. No chest pain. On 2L NC oxygen prn for COPD. [facility]. BP 83J systolic upon arrival. IV fluids started. The history is provided by the patient, medical records and the EMS personnel. Nausea & Vomiting  Duration:  2 days  Number of daily episodes:  2  Quality:  Coffee grounds  Chronicity:  New  Associated symptoms: abdominal pain, cough and diarrhea    Associated symptoms: no fever and no headaches    Abdominal pain:     Location:  Generalized    Onset quality:  Unable to specify    Nursing Notes were reviewed    REVIEW OF SYSTEMS    (2-9 systems for level 4, 10 or more for level 5)     Review of Systems   Unable to perform ROS: Acuity of condition   Constitutional: Negative for fever. Respiratory: Positive for cough. Negative for shortness of breath. Cardiovascular: Negative for chest pain.    Gastrointestinal: Positive for abdominal pain, diarrhea, nausea and vomiting. Coffee ground emesis. Black stools. Neurological: Negative for headaches. Positives and Pertinent negatives as per HPI. PAST MEDICAL HISTORY     Past Medical History:   Diagnosis Date    A-fib ([facility]     BPH (benign prostatic hyperplasia)     CAD (coronary artery disease)     Cancer (HCC)     skin, nose, ear, face    Cerebral artery occlusion with cerebral infarction ([facility]     tia's    [facility]     Hyperlipidemia     Hypertension     Shingles     2018         SURGICAL HISTORY     Past Surgical History:   Procedure Laterality Date    CHOLECYSTECTOMY      HERNIA REPAIR Right 1990    inguinal         CURRENTMEDICATIONS       Previous Medications    ACETAMINOPHEN (TYLENOL) 500 MG TABLET    Take 325 mg by mouth every 6 hours as needed for Pain     ALBUTEROL SULFATE  (90 BASE) MCG/ACT INHALER    Inhale 2 puffs into the lungs every 6 hours as needed for Wheezing    AMIODARONE (PACERONE) 100 MG TABLET    Take 200 mg by mouth daily     ASPIRIN (ASPIRIN 81) 81 MG EC TABLET    Take 81 mg by mouth daily    BUPROPION (WELLBUTRIN XL) 300 MG EXTENDED RELEASE TABLET    Take 300 mg by mouth every morning    BUSPIRONE (BUSPAR) 15 MG TABLET    Take 15 mg by mouth 2 times daily     DOCUSATE SODIUM (COLACE) 100 MG CAPSULE    Take 100 mg by mouth 2 times daily as needed for Constipation    DOXAZOSIN (CARDURA) 4 MG TABLET    Take 4 mg by mouth daily    FERROUS SULFATE (IRON 325) 325 (65 FE) MG TABLET    Take 650 mg by mouth daily (with breakfast)    FLUTICASONE-VILANTEROL (BREO ELLIPTA) 100-25 MCG/INH AEPB INHALER    Inhale into the lungs daily    FUROSEMIDE (LASIX) 20 MG TABLET    Take 10 mg by mouth daily    GABAPENTIN (NEURONTIN) 400 MG CAPSULE    Take 400 mg by mouth 3 times daily.     ISOSORBIDE MONONITRATE (IMDUR) 30 MG EXTENDED RELEASE TABLET    Take 30 mg by mouth nightly    LEVETIRACETAM (KEPPRA) 500 MG TABLET    Take 1,000 mg by mouth 2 times daily    LOPERAMIDE (IMODIUM) 2 MG CAPSULE    Take 2 mg by mouth 4 times daily as needed for Diarrhea    LOSARTAN (COZAAR) 25 MG TABLET    Take 25 mg by mouth daily    MAGNESIUM HYDROXIDE (MILK OF MAGNESIA) 400 MG/5ML SUSPENSION    Take by mouth daily as needed for Constipation    MAGNESIUM OXIDE (MAG-OX) 400 MG TABLET    Take 400 mg by mouth daily    MULTIPLE VITAMINS-MINERALS (THERAPEUTIC MULTIVITAMIN-MINERALS) TABLET    Take 1 tablet by mouth daily    OMEPRAZOLE (PRILOSEC) 20 MG DELAYED RELEASE CAPSULE    Take 20 mg by mouth daily    ONDANSETRON (ZOFRAN) 4 MG TABLET    Take 4 mg by mouth every 8 hours as needed for Nausea or Vomiting    POLYCARBOPHIL (FIBERCON) 625 MG TABLET    Take 625 mg by mouth daily For diarrhea    POTASSIUM CHLORIDE (MICRO-K) 10 MEQ EXTENDED RELEASE CAPSULE    Take 10 mEq by mouth daily    PROMETHAZINE (PHENERGAN) 25 MG TABLET    Take 25 mg by mouth every 6 hours as needed for Nausea    TAMSULOSIN (FLOMAX) 0.4 MG CAPSULE    Take 0.4 mg by mouth nightly    TRAMADOL (ULTRAM) 50 MG TABLET    Take 50 mg by mouth every 6 hours as needed for Pain. UMECLIDINIUM BROMIDE (INCRUSE ELLIPTA) 62.5 MCG/INH AEPB    Inhale into the lungs daily    VITAMIN B-12 (CYANOCOBALAMIN) 500 MCG TABLET    Take 1,000 mcg by mouth daily         ALLERGIES     Patient has no known allergies.     FAMILYHISTORY       Family History   Problem Relation Age of Onset    Cancer Mother         colon    Cancer Father         melanoma, face          SOCIAL HISTORY       Social History     Socioeconomic History    Marital status: Single     Spouse name: None    Number of children: None    Years of education: None    Highest education level: None   Occupational History    None   Social Needs    Financial resource strain: None    Food insecurity     Worry: None     Inability: None    Transportation needs     Medical: None     Non-medical: None   Tobacco Use    Smoking status: present. No wheezing or rales. Abdominal:      General: Bowel sounds are normal.      Palpations: Abdomen is soft. Abdomen is not rigid. Tenderness: There is generalized abdominal tenderness (mild). There is no guarding or rebound. Genitourinary:     Rectum: Guaiac result positive. No mass, tenderness or external hemorrhoid. Normal anal tone. Comments: No active bleeding. Dark brown stool on DANIELLE. Musculoskeletal: Normal range of motion. Skin:     General: Skin is warm and dry. Neurological:      Mental Status: He is alert. Mental status is at baseline. GCS: GCS eye subscore is 4. GCS verbal subscore is 5. GCS motor subscore is 6. Cranial Nerves: No cranial nerve deficit. Sensory: Sensation is intact. No sensory deficit. Motor: Motor function is intact. No abnormal muscle tone. Coordination: Coordination normal.      Comments: Awake. Alert. Sitting up in bed. Moving all extremities. Psychiatric:         Speech: Speech normal.         Behavior: Behavior normal.         Thought Content:  Thought content normal.         DIAGNOSTIC RESULTS   LABS:    Labs Reviewed   CBC WITH AUTO DIFFERENTIAL - Abnormal; Notable for the following components:       Result Value    RBC 4.04 (*)     Hemoglobin 12.3 (*)     Hematocrit 37.8 (*)     RDW 17.1 (*)     Neutrophils Absolute 9.6 (*)     Lymphocytes Absolute 0.4 (*)     All other components within normal limits    Narrative:     Performed at:  [facility]  [address],  [place], [facility]   Phone [phone]   COMPREHENSIVE METABOLIC PANEL W/ REFLEX TO MG FOR LOW K - Abnormal; Notable for the following components:    Potassium reflex Magnesium 5.4 (*)     Chloride 97 (*)     Glucose 136 (*)     BUN 70 (*)     CREATININE 2.3 (*)     GFR Non- 27 (*)     GFR  33 (*)     Calcium 8.1 (*)     Total Protein 5.3 (*)     Alb 2.8 (*)     Alkaline Phosphatase 147 (*)     All other components within normal limits    Narrative:     Performed at:  [facility],  [place] [employer]   Phone [phone]   PROTIME-INR - Abnormal; Notable for the following components:    Protime 13.9 (*)     INR 1.20 (*)     All other components within normal limits    Narrative:     Performed at:  [facility],  [place], [employer]   Phone [phone]   BLOOD OCCULT STOOL DIAGNOSTIC - Abnormal; Notable for the following components:    Occult Blood Diagnostic   (*)     Value: Result: POSITIVE  Normal range: Negative      All other components within normal limits    Narrative:     ORDER#: [id number]                          ORDERED BY: Sona Tatum  SOURCE: Stool                              COLLECTED:  01/14/21 12:40  ANTIBIOTICS AT TJ.:                      RECEIVED :  01/14/21 12:51  Performed at:  [facility],  [employer], [employer]   Phone [phone]   TROPONIN - Abnormal; Notable for the following components:    Troponin 0.03 (*)     All other components within normal limits    Narrative:     Performed at:  [facility],  [place], [employer]   Phone [phone]   LIPASE - Abnormal; Notable for the following components:    Lipase 6.0 (*)     All other components within normal limits    Narrative:     Performed at:  [facility]) [facility],  [place], [employer]   Phone [phone]   BRAIN NATRIURETIC PEPTIDE - Abnormal; Notable for the following components:    Pro- (*)     All other components within normal limits    Narrative:     Performed at:  [facility]) [facility],  [place], [employer]   Phone [phone]   BLOOD GAS, ARTERIAL - Abnormal; Notable for the following components:    pH, Arterial 7.291 (*)     pCO2, Arterial 51.9 (*)     pO2, Arterial 52.5 (*)     Hemoglobin, Art, Extended 10.4 (*)     O2 Sat, Arterial 82.9 (*)     All other components within normal limits    Narrative:     Performed at:  [facility],  [place], [facility]   Phone [phone]   CBC WITH AUTO DIFFERENTIAL - Abnormal; Notable for the following components:    RBC 3.09 (*)     Hemoglobin 9.6 (*)     Hematocrit 29.2 (*)     RDW 17.1 (*)     Lymphocytes Absolute 0.2 (*)     All other components within normal limits    Narrative:     Performed at:  [place] [address],  [employer] [facility]   Phone [phone]   APTT    Narrative:     Performed at:  [place] [address],  [employer], [facility]   Phone [phone]   LACTIC ACID, PLASMA   COVID-19   COVID-19   COVID-19   COVID-19   COVID-19   COVID-19   COVID-19   COVID-19   TYPE AND SCREEN    Narrative:     Performed at:  [place] [address],  [place] [facility], [facility]   Phone [phone]   PREPARE RBC (CROSSMATCH)     Results for orders placed or performed during the hospital encounter of 01/14/21   CBC auto differential   Result Value Ref Range    WBC 10.5 4.0 - 11.0 K/uL    RBC 4.04 (L) 4.20 - 5.90 M/uL    Hemoglobin 12.3 (L) 13.5 - 17.5 g/dL    Hematocrit 37.8 (L) 40.5 - 52.5 %    MCV 93.5 80.0 - 100.0 fL    MCH 30.4 26.0 - 34.0 pg    MCHC 32.5 31.0 - 36.0 g/dL    RDW 17.1 (H) 12.4 - 15.4 %    Platelets 176 281 - 101 K/uL    MPV 8.8 5.0 - 10.5 fL    Neutrophils % 91.3 %    Lymphocytes % 3.8 %    Monocytes % 4.7 %    Eosinophils % 0.0 %    Basophils % 0.2 %    Neutrophils Absolute 9.6 (H) 1.7 - 7.7 K/uL    Lymphocytes Absolute 0.4 (L) 1.0 - 5.1 K/uL    Monocytes Absolute 0.5 0.0 - 1.3 K/uL    Eosinophils Absolute 0.0 0.0 - 0.6 K/uL    Basophils Absolute 0.0 0.0 - 0.2 K/uL   Comprehensive Metabolic Panel w/ Reflex to MG   Result Value Ref Range    Sodium 137 136 - 145 mmol/L    Potassium reflex Magnesium 5.4 (H) 3.5 - 5.1 mmol/L    Chloride 97 (L) 99 - 110 mmol/L    CO2 27 21 - 32 mmol/L    Anion Gap 13 3 - 16    Glucose 136 (H) 70 - 99 mg/dL    BUN 70 (H) 7 - 20 mg/dL    CREATININE 2.3 (H) 0.8 - 1.3 mg/dL    GFR Non-African American 27 (A) >60    GFR  33 (A) >60    Calcium 8.1 (L) 8.3 - 10.6 mg/dL    Total Protein 5.3 (L) 6.4 - 8.2 g/dL    Alb 2.8 (L) 3.4 - 5.0 g/dL    Albumin/Globulin Ratio 1.1 1.1 - 2.2    Total Bilirubin 0.6 0.0 - 1.0 mg/dL    Alkaline Phosphatase 147 (H) 40 - 129 U/L    ALT 13 10 - 40 U/L    AST 22 15 - 37 U/L    Globulin 2.5 g/dL   Protime-INR   Result Value Ref Range    Protime 13.9 (H) 10.0 - 13.2 sec    INR 1.20 (H) 0.86 - 1.14   APTT   Result Value Ref Range    aPTT 29.2 24.2 - 36.2 sec   Blood occult stool #1   Result Value Ref Range    Occult Blood Diagnostic Result: POSITIVE  Normal range: Negative   (A)    Troponin   Result Value Ref Range    Troponin 0.03 (H) <0.01 ng/mL   Lipase   Result Value Ref Range    Lipase 6.0 (L) 13.0 - 60.0 U/L   Brain Natriuretic Peptide   Result Value Ref Range    Pro- (H) 0 - 449 pg/mL   Blood gas, arterial   Result Value Ref Range    pH, Arterial 7.291 (L) 7.350 - 7.450    pCO2, Arterial 51.9 (H) 35.0 - 45.0 mmHg    pO2, Arterial 52.5 (L) 75.0 - 108.0 mmHg    HCO3, Arterial 24.5 21.0 - 29.0 mmol/L    Base Excess, Arterial -2.4 -3.0 - 3.0 mmol/L    Hemoglobin, Art, Extended 10.4 (L) 13.5 - 17.5 g/dL    O2 Sat, Arterial 82.9 (L) >92 %    Carboxyhgb, Arterial 0.3 0.0 - 1.5 %    Methemoglobin, Arterial 0.3 <1.5 %    TCO2, Arterial 26.0 Not Established mmol/L    O2 Content, Arterial 12 Not Established mL/dL    O2 Therapy See comment    CBC Auto Differential   Result Value Ref Range    WBC 5.5 4.0 - 11.0 K/uL    RBC 3.09 (L) 4.20 - 5.90 M/uL    Hemoglobin 9.6 (L) 13.5 - 17.5 g/dL    Hematocrit 29.2 (L) 40.5 - 52.5 %    MCV 94.5 80.0 - 100.0 fL    MCH 31.0 26.0 - 34.0 pg    MCHC 32.8 31.0 - 36.0 g/dL    RDW 17.1 (H) 12.4 - 15.4 %    Platelets 907 248 - 824 K/uL    MPV 9.0 5.0 - 10.5 fL    Neutrophils % 91.4 %    Lymphocytes % 3.6 %    Monocytes % 4.8 %    Eosinophils % 0.1 %    Basophils % 0.1 %    Neutrophils Absolute 5.0 1.7 - 7.7 K/uL    Lymphocytes Absolute 0.2 (L) 1.0 - 5.1 K/uL    Monocytes Absolute 0.3 0.0 - 1.3 K/uL    Eosinophils Absolute 0.0 0.0 - 0.6 K/uL    Basophils Absolute 0.0 0.0 - 0.2 K/uL   EKG 12 Lead   Result Value Ref Range    Ventricular Rate 93 BPM    Atrial Rate 93 BPM    P-R Interval 164 ms    QRS Duration 92 ms    Q-T Interval 358 ms    QTc Calculation (Bazett) 445 ms    P Axis 50 degrees    R Axis 61 degrees    T Axis 90 degrees    Diagnosis       Normal sinus rhythmAnterior infarct , age undeterminedNonspecific ST abnormalityAbnormal ECGConfirmed by LAKEISHA Alexis MD ([id number]) on 1/14/2021 5:36:08 PM   TYPE AND SCREEN   Result Value Ref Range    ABO/Rh A POS     Antibody Screen NEG    PREPARE RBC (CROSSMATCH), 1 Units   Result Value Ref Range    Product Code Blood Bank [id number]     Description Blood Bank Red Blood Cells, Leuko-reduced     Unit Number [id number]     Dispense Status Blood Bank released     Product Code Blood Bank [id number]     Description Blood Bank Red Blood Cells, Leuko-reduced     Unit Number [id number]     Dispense Status Blood Bank issued      All other labs were within normal range or not returned as of this dictation. EKG: All EKG's are interpreted by the Emergency Department Physician in the absence of a cardiologist.  Please see their note for interpretation of EKG.       RADIOLOGY:   Non-plain film images such as CT, Ultrasound and MRI are read by the radiologist. Plain radiographic images are visualized andpreliminarily interpreted by the  ED Provider with the below findings:        Interpretation pert Radiologist below, if available at the time of this note:    [facility] Final Result   Fluid-filled stomach and small bowel without definite obstruction. Gastroenteritis may be present. Cirrhotic appearance of the liver without   splenomegaly. Small amount of perihepatic ascites. Small pelvic ascites. Small left and very small right pleural effusions with moderate bibasilar   pulmonary opacities either due to atelectasis or pneumonia. Moderate   anasarca. XR CHEST PORTABLE   Final Result   1. Cardiomegaly   2. Bibasilar hypoaeration   3. Linear opacities right parahilar region could represent scarring,   atelectasis or pneumonia         IR FLUORO GUIDED CVA DEVICE PLMT/REPLACE/REMOVAL    (Results Pending)     Ct Abdomen Pelvis Wo Contrast    Result Date: 1/14/2021  EXAMINATION: CT OF THE ABDOMEN AND PELVIS WITHOUT CONTRAST 1/14/2021 2:09 pm TECHNIQUE: CT of the abdomen and pelvis was performed without the administration of intravenous contrast. Multiplanar reformatted images are provided for review. Dose modulation, iterative reconstruction, and/or weight based adjustment of the mA/kV was utilized to reduce the radiation dose to as low as reasonably achievable. COMPARISON: Portable chest 01/14/2021 HISTORY: ORDERING SYSTEM PROVIDED HISTORY: abd pain, gi bleed TECHNOLOGIST PROVIDED HISTORY: Reason for exam:->abd pain, gi bleed Reason for Exam: ABD PAIN/ POS. GI BLEED Acuity: Acute Type of Exam: Initial FINDINGS: Lower Chest: Small bilateral pleural effusions with moderate opacity present in each lower lobe most likely due to atelectasis although pneumonia is possible. No abnormal pulmonary vascular congestion. Normal size of the heart. Organs: Liver has a lobular contour. No worrisome focal lesion. Large amount of intra biliary air in the left lobe. Gallbladder surgically absent. No biliary stent in place. Mild diffuse enlargement of the adrenal glands compatible with benign hyperplasia. Tiny nonobstructing calculus questioned in the mid left kidney.   Other abdominal organs appear normal. GI/Bowel: Stomach full of fluid. Small bowel also moderately distended and fluid-filled. Mild gaseous distention of the colon. Moderate rectal stool with appearance of impaction. Moderate sigmoid diverticulosis without evident diverticulitis. Severe diverticulosis of the descending colon also present. Distal small bowel less distended than the proximal small bowel. No transition zone identified. Pelvis: Pappas catheter in the bladder. Mild enlargement of the prostate. Small amount of pelvic ascites. No adenopathy. Peritoneum/Retroperitoneum: Severe atherosclerotic disease. No aneurysm. No retroperitoneal adenopathy. Mild amount of perihepatic ascites. No free air. Bones/Soft Tissues: Moderate anasarca. Severe lumbar spondylosis. Severe compression fracture of T11. Mild fracture of L4 L1 T9. No acute fracture lines identified. Severe osteoporosis. Fluid-filled stomach and small bowel without definite obstruction. Gastroenteritis may be present. Cirrhotic appearance of the liver without splenomegaly. Small amount of perihepatic ascites. Small pelvic ascites. Small left and very small right pleural effusions with moderate bibasilar pulmonary opacities either due to atelectasis or pneumonia. Moderate anasarca. Xr Chest Portable    Result Date: 1/14/2021  EXAMINATION: ONE XRAY VIEW OF THE CHEST 1/14/2021 1:35 pm COMPARISON: None. HISTORY: ORDERING SYSTEM PROVIDED HISTORY: chest pain TECHNOLOGIST PROVIDED HISTORY: Reason for exam:->chest pain Reason for Exam: chest pain Acuity: Acute Type of Exam: Initial FINDINGS: Cardiomegaly. Bibasilar hypoaeration. Linear opacities seen in the right parahilar region. No pneumothorax. No pleural effusion. 1. Cardiomegaly 2. Bibasilar hypoaeration 3.  Linear opacities right parahilar region could represent scarring, atelectasis or pneumonia         PROCEDURES   Unless otherwise noted below, none Procedures    CRITICAL CARE TIME   Critical care provided for this patient of which 45 min were spend on critical care and decision making. 0 min spent on procedures. There was imminent failure of an organ system which required critical intervention to prevent clinically significant progression of life threatening deterioration of the patient's condition to the point of disability or death.       CONSULTS:  IP CONSULT TO HOSPITALIST  PHARMACY TO DOSE VANCOMYCIN  IP CONSULT TO GI  IP CONSULT TO PULMONOLOGY      EMERGENCY DEPARTMENT COURSE and DIFFERENTIAL DIAGNOSIS/MDM:   Vitals:    Vitals:    01/14/21 2022 01/14/21 2026 01/14/21 2040 01/14/21 2047   BP: (!) 83/53 101/88 92/64    Pulse: 92 93 93 96   Resp: 27 29 (!) 31 29   Temp:       TempSrc:       SpO2: 90% 91% 91% 95%   Weight:       Height:           Patient was given thefollowing medications:  Medications   lidocaine 1 % injection 5 mL (has no administration in time range)   sodium chloride flush 0.9 % injection 10 mL (has no administration in time range)   sodium chloride flush 0.9 % injection 10 mL (has no administration in time range)   norepinephrine (LEVOPHED) 16 mg in dextrose 5 % 250 mL infusion (12 mcg/min Intravenous Rate/Dose Change 1/14/21 2015)   octreotide (SANDOSTATIN) 500 mcg in sodium chloride 0.9 % 100 mL infusion (25 mcg/hr Intravenous New Bag 1/14/21 1900)   cefTRIAXone (ROCEPHIN) 2 g IVPB in D5W 50ml minibag (has no administration in time range)   0.9 % sodium chloride infusion 250 mL (has no administration in time range)   pantoprazole (PROTONIX) injection 40 mg (40 mg Intravenous Given 1/14/21 1250)   ondansetron (ZOFRAN) injection 4 mg (4 mg Intravenous Given 1/14/21 1250)   0.9 % sodium chloride bolus (0 mLs Intravenous Stopped 1/14/21 1841)   0.9 % sodium chloride bolus (0 mLs Intravenous Stopped 1/14/21 1837)   meropenem (MERREM) 1 g in sterile water 20 mL IV syringe (1 g Intravenous Given 1/14/21 1701)   vancomycin (VANCOCIN) 1,250 Take 6.25 mg by mouth 2 times daily (with meals)    DIPHENHYDRAMINE (BENADRYL) 25 MG TABLET    Take 25 mg by mouth nightly    PHENAZOPYRIDINE HCL (AZO DINE MAXIMUM STRENGTH PO)    Take by mouth daily as needed    SPIRONOLACTONE (ALDACTONE) 25 MG TABLET    Take 25 mg by mouth daily              (Please note that portions ofthis note were completed with a voice recognition program.  Efforts were made to edit the dictations but occasionally words are mis-transcribed.)    Alexey Emerson PA-C (electronically signed)            Mona Fried PA-C  01/14/21 9077

## 2021-01-14 NOTE — PROGRESS NOTES
01/14/21 1818   NIV Type   $NIV $Daily Charge   Skin Assessment Clean, dry, & intact   Equipment Type VAPOTHERM   Settings/Measurements   Resp 25   O2 Flow Rate (L/min) 25 L/min   FiO2  80 %

## 2021-01-14 NOTE — PROGRESS NOTES
01/14/21 1818   NIV Type   $NIV $Daily Charge   Skin Assessment Clean, dry, & intact   NIV Started/Stopped On   Equipment Type VAPOTHERM   Settings/Measurements   Resp 25   O2 Flow Rate (L/min) 25 L/min   FiO2  80 %

## 2021-01-14 NOTE — ED PROVIDER NOTES
I independently examined and evaluated Giovany Major. In brief, patient is an 81-year-old male who presents with concerns for possible GI bleed. Patient reportedly was having episodes of coffee-ground emesis prior to arrival.  Per chart review and review of records from his nursing home, it does not appear that he is not on any anticoagulation. He is a DNR comfort care and does arrive with paperwork. Patient is able to tell me that he is thirsty but denies other complaints or concerns. Focused exam revealed:  General: Cachectic, resting comfortably  HEENT: Normocephalic, atraumatic  CV: Heart regular rate and rhythm, no murmurs rubs or gallops  Lung: Lungs clear to auscultation bilaterally, no wheezing or rhonchi  Abdomen: Soft, nondistended, nontender  Extremities:  All extremities neurovascularly intact with soft compartments    Labs Reviewed   CBC WITH AUTO DIFFERENTIAL - Abnormal; Notable for the following components:       Result Value    RBC 4.04 (*)     Hemoglobin 12.3 (*)     Hematocrit 37.8 (*)     RDW 17.1 (*)     Neutrophils Absolute 9.6 (*)     Lymphocytes Absolute 0.4 (*)     All other components within normal limits    Narrative:     Performed at:  [facility] [address],  [place], [place]   Phone [phone]   COMPREHENSIVE METABOLIC PANEL W/ REFLEX TO MG FOR LOW K - Abnormal; Notable for the following components:    Potassium reflex Magnesium 5.4 (*)     Chloride 97 (*)     Glucose 136 (*)     BUN 70 (*)     CREATININE 2.3 (*)     GFR Non- 27 (*)     GFR  33 (*)     Calcium 8.1 (*)     Total Protein 5.3 (*)     Alb 2.8 (*)     Alkaline Phosphatase 147 (*)     All other components within normal limits    Narrative:     Performed at:  [facility] [place] [place]) - [facility] [address],  [place], [place]   Phone [phone]   PROTIME-INR - Abnormal; Notable for the following components:    Protime 13.9 (*)     INR 1.20 (*)     All other components within normal limits    Narrative:     Performed at:  [facility] [address],  [place], [place]   Phone [phone]   BLOOD OCCULT STOOL DIAGNOSTIC - Abnormal; Notable for the following components:    Occult Blood Diagnostic   (*)     Value: Result: POSITIVE  Normal range: Negative      All other components within normal limits    Narrative:     ORDER#: [id number]                          ORDERED BY: Jodie Suarze  SOURCE: Stool                              COLLECTED:  01/14/21 12:40  ANTIBIOTICS AT TJ.:                      RECEIVED :  01/14/21 12:51  Performed at:  [facility] [address],  [place], [place]   Phone [phone]   TROPONIN - Abnormal; Notable for the following components:    Troponin 0.03 (*)     All other components within normal limits    Narrative:     Performed at:  [place] [address],  [place], [place]   Phone [phone]   LIPASE - Abnormal; Notable for the following components:    Lipase 6.0 (*)     All other components within normal limits    Narrative:     Performed at:  [facility] [address],  [place], [place]   Phone [phone]   BRAIN NATRIURETIC PEPTIDE - Abnormal; Notable for the following components:    Pro- (*)     All other components within normal limits    Narrative:     Performed at:  [facility] [address],  [place], [place]   Phone [phone]   APTT    Narrative:     Performed at:  [facility]) [facility] [address],  [place], [place]   Phone [phone]   LACTIC ACID, PLASMA   COVID-19   COVID-19   COVID-19   COVID-19   TYPE AND SCREEN    Narrative:     Performed at:  [facility] [address],  [employer] Phone [phone]     CT ABDOMEN PELVIS WO CONTRAST   Final Result   Fluid-filled stomach and small bowel without definite obstruction. Gastroenteritis may be present. Cirrhotic appearance of the liver without   splenomegaly. Small amount of perihepatic ascites. Small pelvic ascites. Small left and very small right pleural effusions with moderate bibasilar   pulmonary opacities either due to atelectasis or pneumonia. Moderate   anasarca. XR CHEST PORTABLE   Final Result   1. Cardiomegaly   2. Bibasilar hypoaeration   3. Linear opacities right parahilar region could represent scarring,   atelectasis or pneumonia           The Ekg interpreted by me shows  normal sinus rhythm with a rate of 93  Axis is   Normal  QTc is  normal  Intervals and Durations are unremarkable. ST Segments: nonspecific changes  No previous available for comparison    ED course: Patient is an 31-year-old male, DNR CC who arrives with paperwork, brought in with concerns for coffee-ground emesis. Full HPI as detailed above. Patient was seen in conjunction with the midlevel practitioner, please refer to her note for further details of his work-up. He was hypotensive on arrival, was treated with IV fluids without improvement of his blood pressure. Hemoglobin is stable and he has no active blood loss here in the department. Work-up did show evidence of pneumonia as well, patient was started on antibiotics. CT with evidence of enteritis. Given his persistent low blood pressures, central access will be obtained in case patient will need Levophed. He may additionally require blood transfusions and a type and screen was obtained. Remained stable throughout his course of stay in the ED. He will be hospitalized for further work-up and treatment. All diagnostic, treatment, and disposition decisions were made by myself in conjunction with the advanced practice provider.     For all further details of the patient's emergency department visit, please see the advanced practice provider's documentation. Comment: Please note this report has been produced using speech recognition software and may contain errors related to that system including errors in grammar, punctuation, and spelling, as well as words and phrases that may be inappropriate. If there are any questions or concerns please feel free to contact the dictating provider for clarification.        Miroslava Roman MD  01/14/21 3483

## 2021-01-15 NOTE — PROGRESS NOTES
md notified of PO keppra order, asked for IV.  MD also messaged about rapidly increasing levophed requirement, now at 25mcg/min

## 2021-01-15 NOTE — PROGRESS NOTES
I have received the feedback from the Endo department and anesthesia department. It is best to wait longer before doing an EGD according to Dr. Michelle Wong and I agree with him. Continue conservative and supportive treatment. The issue of DNR-CC will also need to be kept in mind in deciding about interventional steps such as an EGD. Availability of previous record is important. In case, the patient has been under the care of another gastroenterologist, then that her relationship should also be respected. Summary: No EGD is being planned today.

## 2021-01-15 NOTE — PROGRESS NOTES
Progress Note    Admit Date:  1/14/2021    Subjective:  Mr. Amy Saunders is currently on BiPAP. On Levophed and vasopressin. Objective:   Patient Vitals for the past 4 hrs:   BP Pulse Resp SpO2   01/15/21 0837 99/68 97 28 96 %   01/15/21 0830 -- 97 -- --            Intake/Output Summary (Last 24 hours) at 1/15/2021 1224  Last data filed at 1/15/2021 0843  Gross per 24 hour   Intake 5741.32 ml   Output 25 ml   Net 5716.32 ml       Physical Exam:    General appearance:  No apparent distress, appears stated age, minimally responsive, no distress, on BiPAP. HEENT:  Normal cephalic, atraumatic without obvious deformity. Pupils equal, round  Conjunctivae/corneas clear. Neck: Supple, with full range of motion. No jugular venous distention. Trachea midline. Respiratory:  Normal respiratory effort   Cardiovascular:  Regular rhythm, tachy rate  Abdomen: Soft, non-tender, non-distended with normal bowel sounds. Musculoskeletal:  No clubbing, cyanosis or edema bilaterally   Skin: Skin color, texture, turgor normal.   Neuro. -Minimally responsive. Able to obey simple commands.     Scheduled Meds:   ipratropium-albuterol  1 ampule Inhalation Q4H WA    levetiracetam  1,000 mg Intravenous Q12H    methylPREDNISolone  60 mg Intravenous Q12H    amiodarone  200 mg Oral Daily    buPROPion  300 mg Oral QAM    busPIRone  15 mg Oral BID    ferrous sulfate  650 mg Oral Daily with breakfast    tamsulosin  0.4 mg Oral Nightly    cefepime  1 g Intravenous Q12H    sodium chloride flush  10 mL Intravenous 2 times per day    pantoprazole  40 mg Intravenous Q12H    And    sodium chloride (PF)  10 mL Intravenous Q12H       Continuous Infusions:   vasopressin (Septic Shock) infusion 0.04 Units/min (01/15/21 0826)    sodium chloride 150 mL/hr at 01/15/21 0843    norepinephrine 21 mcg/min (01/15/21 0946)    octreotide (SANDOSTATIN) infusion 25 mcg/hr (01/15/21 0915)       PRN Meds:  albuterol sulfate HFA, sodium chloride flush, promethazine **OR** ondansetron, acetaminophen **OR** acetaminophen      Data:  CBC:   Recent Labs     01/14/21  1210 01/14/21  1850 01/15/21  0014 01/15/21  0610   WBC 10.5 5.5  --  9.4   HGB 12.3* 9.6* 11.8* 11.1*   HCT 37.8* 29.2* 35.5* 33.8*   MCV 93.5 94.5  --  92.6    138  --  156     BMP:   Recent Labs     01/14/21  1210 01/15/21  0014 01/15/21  0610    137 135*   K 5.4* 5.6* 5.3*   CL 97* 103 102   CO2 27 22 21   BUN 70* 78* 84*   CREATININE 2.3* 2.3* 2.5*     LIVER PROFILE:   Recent Labs     01/14/21  1210   AST 22   ALT 13   LIPASE 6.0*   BILITOT 0.6   ALKPHOS 147*     PT/INR:   Recent Labs     01/14/21  1210   PROTIME 13.9*   INR 1.20*       CULTURES  None     RADIOLOGY    IR FLUORO GUIDED CVA DEVICE PLMT/REPLACE/REMOVAL   Final Result   Successful ultrasound and fluoroscopy guided non-tunneled right internal   jugular catheter placement. CT ABDOMEN PELVIS WO CONTRAST   Final Result   Fluid-filled stomach and small bowel without definite obstruction. Gastroenteritis may be present. Cirrhotic appearance of the liver without   splenomegaly. Small amount of perihepatic ascites. Small pelvic ascites. Small left and very small right pleural effusions with moderate bibasilar   pulmonary opacities either due to atelectasis or pneumonia. Moderate   anasarca. XR CHEST PORTABLE   Final Result   1. Cardiomegaly   2. Bibasilar hypoaeration   3.  Linear opacities right parahilar region could represent scarring,   atelectasis or pneumonia           Assessment/Plan:    Septic Shock  Aspiration PNA  COPD AE  Acute on Chronic Hypoxic Respiratory Failure  - wears 2L at baseline, had an aspiration event in ED and O2 requirements increased - pt was placed on vapotherm; no intubation   - initial XR showed linear opacities in right parahilar region and CT abdomen showed small left and very small right pleural effusions with moderate bibasilar opacities  - Admitted to ICU, tele, droplet + precautions, SLP eval  - supp O2, wean as tolerated, COVID PCR pending  - on Levo, started on Vasopressin  - Vanc and Cefepime D#2, Solumedrol, PRN albuterol  - Critical care consulted    GI Bleed - with coffee ground emesis  Iron Deficiency Anemia  History of cirrhosis  - Hgb on arrival: 12.3, repeat 9.6; occult stool positive  - s/p 2 units PRBCs  - check iron studies, H&H q6hr  - NPO, IVF, IV PPI, octreotide, ferrous sulfate  - GI consulted - hold off on EGD until pt is more medically stable  - Hgb stable at 11.1    CORA  Hyperkalemia  Non-anion gap metabolic acidosis  - Cr 2.3 on arrival, K 5.3  - IVF, on pressors; hold BP meds as below  - Nephro consult, daily renal panel  Started on a bicarb drip    Elevated Troponin  - 0.03  - trend, tele  - 0.02 > 0.03    CAD  PAF  Hx of Systolic CHF - now with recovered EF  - cardiologist - Dr. 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

## 2021-01-15 NOTE — PROGRESS NOTES
Care assumed at this time. Patient alert to self. NSR/ST with pulse . HHFNC at 25L/100% to maintain o2 sat >90%. Levophed infusing at 12mcg/min, octreotide 25mcg/h.

## 2021-01-15 NOTE — CONSULTS
[id number] [facility],  [address] [place] [address], [address] [place] [address]  Phone: Tariq He is a Single [1] White [1] 81 y.o. . male      Main Problems/Chief Complaint     Acute UGI bleeding     Clinical Summary      The patient is admitted with respiratory compromise and UGI bleeding. PAST MEDICAL HISTORY     Past Medical History:   Diagnosis Date    A-fib ([address]     BPH (benign prostatic hyperplasia)     CAD (coronary artery disease)     Cancer (HCC)     skin, nose, ear, face    Cerebral artery occlusion with cerebral infarction ([facility]     tia's    COPD (chronic obstructive pulmonary disease) ([facility]     Hyperlipidemia     Hypertension     Shingles     2018     FAMILY HISTORY     Family History   Problem Relation Age of Onset    Cancer Mother         colon    Cancer Father         melanoma, face       SURGICAL HISTORY     Past Surgical History:   Procedure Laterality Date    CHOLECYSTECTOMY      HERNIA REPAIR Right 1990    inguinal     CURRENT MEDICATIONS   (This list may include medications prescribed during this encounter as epic can not insert only the list prior to this encounter.)      ALLERGIES   No Known Allergies    PE    Pt appeared very frail. Due to the current efforts to prevent transmission of COVID-19 and also the need to preserve PPE for other caregivers, a face-to-face encounter with the patient was not performed. The patient was evaluated through a transparent barrier while he was in his bed. That being said, all relevant records and diagnostic tests were reviewed, including laboratory results and imaging. Please reference any relevant documentation elsewhere. Care will be coordinated with the primary service. FINAL IMPRESSION AND RECOMMENDATIONS     Discussed with the nurse taking care of him. She has spoken to the POA who is a long-term girl friend. POA does not want any EGD.  The risks of this simple procedure for most are too high in my opinion. Conservative therapy seems to be getting the patient better. [facility] will sign off at this time, but if the situation changes and an EGD is needed, please feel free to call [facility], unless the patient is under the care of another GI Group. The total time spent for cognitive work for this visit was 40 minutes with more than 50% of the time spent in coordination of care and management. Elier Murphy 1/15/21 6:38 PM EST    CC:  [facility]      IMPORTANT: Please note that some portions of this note may have been created using Dragon voice recognition software. Some \"sound-alike\" and totally wrong word substitutions may have taken place due to known inherent limitations of any such software, including this voice recognition software. In spite of efforts to eliminate such errors, some may not have been corrected. So please read the note with this in mind and recognize such mistakes and understand the correct version using the  context. Thanks.

## 2021-01-15 NOTE — PROGRESS NOTES
01/15/21 0451   NIV Type   Skin Assessment   (scab on nose)   Skin Protection for O2 Device Yes   Location Nose   Intervention(s) Other (Comment)  (padding )   NIV Started/Stopped On   Equipment Type V60   Mode Bilevel   Mask Type Full face mask   Mask Size Medium   Settings/Measurements   IPAP 16 cmH20   CPAP/EPAP 8 cmH2O   Rate Ordered 14   Resp (!) 33   FiO2  100 %   Vt Exhaled 557 mL   Mask Leak (lpm) 29 lpm   Comfort Level Good   Using Accessory Muscles No   SpO2 97   Alarm Settings   Alarms On Y

## 2021-01-15 NOTE — ED NOTES
21  - called Nephrology routine consult Dr. Aneudy Pelayo put in   [phone] - Dr. Alexandria Young called back for on call.       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     Pain  Denies    Labs  Other Labs: Yes: 10/16/19    Imaging  CT: 10/16/19      Dental:   Most Recent Dental Visit: Yes  Trays: Frequency Daily    Speech/Swallowing:   Most Recent evaluation or testing: Yes  Swallowing Restrictions: No difficulties with swallowing    Trismus/Jaw Exercises: Yes    Nutrition:    Weight:   Wt Readings from Last 3 Encounters:   10/30/19 118.7 kg (261 lb 11.2 oz)   10/07/19 117.9 kg (260 lb)   19 112.5 kg (248 lb)         Oral Symptoms:   Xerostomia:1- Symptomatic without significant dietary alteration; unstimulated saliva flow >0.2 ml/min  Dysphagia: 0-None  Mucositis Oral Symptoms: 0-None  Mucositis: 0- None  Esophagitis:0- None    Other Appointments:     MD Name:Dr Treviño  Appointment Date:    MD Name: Appointment Date:   MD Name: Appointment Date:   Other Appointment Notes:     Residual Radiation side effect: Dry mouth, no taste    Additional Instructions:     Nurse face-to-face time: Level 3:  10 min face to face time

## 2021-01-15 NOTE — CONSULTS
Pharmacy to dose vancomycin per BLAINE Koch. Patient received 1250 mg dose in ER on 1/14/21 at 1650. Further dosing per pharmacy as requested if patient is to be admitted.   Ned Bang [facility]

## 2021-01-15 NOTE — PROGRESS NOTES
An improvement is noted in the patient's general condition. If he is able to give the consent and if anesthesia feels we can proceed with it, maybe with endotracheal tube in place, an EGD is being planned to be performed around noon.

## 2021-01-15 NOTE — H&P
Hospital Medicine History & Physical      PCP: [facility]    Date of Admission: 1/14/2021    Date of Service: Pt seen/examined on 1/14/2021 and Admitted to Inpatient with expected LOS greater than two midnights due to medical therapy     Chief Complaint: Emesis      History Of Present Illness:     81 y.o. male presents from [facility] with complaint of coffee ground in appearance emesis. Initially presented hypotensive, was stable on his chronic O2 requirement of 2 lpm NC O2 for COPD. During evaluation in the ER he aspirated and developed worsening hypoxemic respiratory failure with intubation recommended, patient declined after confiriming DNR cca status. Hypoxemia improved with vapotherm. Currently patient is minimally responsive on vapotherm but appears to be in no distress. Past Medical History:          Diagnosis Date    A-fib ([address]     BPH (benign prostatic hyperplasia)     CAD (coronary artery disease)     Cancer (HCC)     skin, nose, ear, face    Cerebral artery occlusion with cerebral infarction (HCC)     tia's    COPD (chronic obstructive pulmonary disease) (HCC)     Hyperlipidemia     Hypertension     Shingles     2018       Past Surgical History:          Procedure Laterality Date    CHOLECYSTECTOMY      HERNIA REPAIR Right 1990    inguinal       Medications Prior to Admission:      Prior to Admission medications    Medication Sig Start Date End Date Taking?  Authorizing Provider   buPROPion (WELLBUTRIN XL) 300 MG extended release tablet Take 300 mg by mouth every morning   Yes Historical Provider, MD   docusate sodium (COLACE) 100 MG capsule Take 100 mg by mouth 2 times daily as needed for Constipation   Yes Historical Provider, MD   vitamin B-12 (CYANOCOBALAMIN) 500 MCG tablet Take 1,000 mcg by mouth daily   Yes Historical Provider, MD   doxazosin (CARDURA) 4 MG tablet Take 4 mg by mouth daily   Yes Historical Provider, MD   ferrous sulfate (IRON 325) 325 (65 Fe) MG tablet Take 650 mg by mouth daily (with breakfast)   Yes Historical Provider, MD   polycarbophil (FIBERCON) 625 MG tablet Take 625 mg by mouth daily For diarrhea   Yes Historical Provider, MD   furosemide (LASIX) 20 MG tablet Take 10 mg by mouth daily   Yes Historical Provider, MD   gabapentin (NEURONTIN) 400 MG capsule Take 400 mg by mouth 3 times daily. Yes Historical Provider, MD   loperamide (IMODIUM) 2 MG capsule Take 2 mg by mouth 4 times daily as needed for Diarrhea   Yes Historical Provider, MD   fluticasone-vilanterol (BREO ELLIPTA) 100-25 MCG/INH AEPB inhaler Inhale into the lungs daily   Yes Historical Provider, MD   levETIRAcetam (KEPPRA) 500 MG tablet Take 1,000 mg by mouth 2 times daily   Yes Historical Provider, MD   losartan (COZAAR) 25 MG tablet Take 25 mg by mouth daily   Yes Historical Provider, MD   Multiple Vitamins-Minerals (THERAPEUTIC MULTIVITAMIN-MINERALS) tablet Take 1 tablet by mouth daily   Yes Historical Provider, MD   potassium chloride (MICRO-K) 10 MEQ extended release capsule Take 10 mEq by mouth daily   Yes Historical Provider, MD   promethazine (PHENERGAN) 25 MG tablet Take 25 mg by mouth every 6 hours as needed for Nausea   Yes Historical Provider, MD   traMADol (ULTRAM) 50 MG tablet Take 50 mg by mouth every 6 hours as needed for Pain.    Yes Historical Provider, MD   ondansetron (ZOFRAN) 4 MG tablet Take 4 mg by mouth every 8 hours as needed for Nausea or Vomiting   Yes Historical Provider, MD   busPIRone (BUSPAR) 15 MG tablet Take 15 mg by mouth 2 times daily    Yes Historical Provider, MD   isosorbide mononitrate (IMDUR) 30 MG extended release tablet Take 30 mg by mouth nightly   Yes Historical Provider, MD   tamsulosin (FLOMAX) 0.4 MG capsule Take 0.4 mg by mouth nightly   Yes Historical Provider, MD   amiodarone (PACERONE) 100 MG tablet Take 200 mg by mouth daily    Yes Historical Provider, MD   magnesium oxide (MAG-OX) 400 MG tablet Take 400 mg by mouth daily   Yes Historical Provider, MD   omeprazole (PRILOSEC) 20 MG delayed release capsule Take 20 mg by mouth daily   Yes Historical Provider, MD   aspirin (ASPIRIN 81) 81 MG EC tablet Take 81 mg by mouth daily   Yes Historical Provider, MD   acetaminophen (TYLENOL) 500 MG tablet Take 325 mg by mouth every 6 hours as needed for Pain    Yes Historical Provider, MD   Umeclidinium Bromide (INCRUSE ELLIPTA) 62.5 MCG/INH AEPB Inhale into the lungs daily   Yes Historical Provider, MD   albuterol sulfate  (90 Base) MCG/ACT inhaler Inhale 2 puffs into the lungs every 6 hours as needed for Wheezing   Yes Historical Provider, MD   magnesium hydroxide (MILK OF MAGNESIA) 400 MG/5ML suspension Take by mouth daily as needed for Constipation   Yes Historical Provider, MD       Allergies:  Patient has no known allergies. Social History:       TOBACCO:   reports that he has quit smoking. His smoking use included cigarettes. He smoked 0.50 packs per day. He has never used smokeless tobacco.  ETOH:   reports previous alcohol use. Family History:             Problem Relation Age of Onset    Cancer Mother         colon    Cancer Father         melanoma, face       REVIEW OF SYSTEMS:   Pertinent positives as noted in the HPI. All other systems reviewed and negative. PHYSICAL EXAM PERFORMED:    BP 89/64   Pulse 117   Temp 97.9 °F (36.6 °C) (Temporal)   Resp (!) 35   Ht 5' 6\" (1.676 m)   Wt 162 lb (73.5 kg)   SpO2 (!) 84%   BMI 26.15 kg/m²     General appearance:  No apparent distress, appears stated age, minimally responsive, no distress  HEENT:  Normal cephalic, atraumatic without obvious deformity. Pupils equal, round  Conjunctivae/corneas clear. Neck: Supple, with full range of motion. No jugular venous distention. Trachea midline. Respiratory:  Normal respiratory effort   Cardiovascular:  Regular rhythm, tachy rate  Abdomen: Soft, non-tender, non-distended with normal bowel sounds.   Musculoskeletal:  No clubbing, cyanosis or DNR-CC       Dispo - icu  Tot crit care time > 35 min       Mary Cruz MD    Thank you [facility] for the opportunity to be involved in this patient's care. If you have any questions or concerns please feel free to contact me at [phone].

## 2021-01-15 NOTE — CONSULTS
I was called from the emergency dept about this patient with multiple problems - respiratory difficulties and hypotension in the ED. I will be involved with this patient's care related to the gastroenterology aspect in light of the UGI bleeding. The patient has DNR-CC according to the information provided to me by BLAINE Ledezma.      The electronic record is reviewed. Unfortunately, the chart is incomplete because the motion between the current chart and the previous chart has not taken place. I have been told that one of the providers recall seeing this patient multiple times but most of the visits are not available on the current chart.     For acute upper GI bleeding etiologies in this patient, esophagitis, PUD,  gastritis to some of the possibilities. The patient has already been started on IV PPI for acid suppressive therapy.      The cardiopulmonary issues are going to have higher priority in terms of stabilization prior to an EGD.     At this time, the best approach would be to continue conservative treatment and not perform an EGD until the picture becomes more clear. The family may not want any invasive procedure anyway even if the patient cardiopulmonary status stabilized enough to safely perform an EGD. As discussed, intubation seems to be necessary in this elderly patient who is at the high risk of aspiration during an EGD.

## 2021-01-15 NOTE — PROGRESS NOTES
08:30 Pt awakes to voice, opens eyes follows all verbal commands, speech difficult to understand at this time r/t pt is on biap. Assessment as charted , pt on levo and vasopressin to suppor BP, POA called and updated . Dr. Damion Duffy called , pt status and all labs reviewed w/ MD. MD changed some meds to IV pt npo @ this time r/t Raquel Escort. Aspiration. MD aware of K level , this RN titrating  Levo down  per orders and as tolerated   15:30 Pt remains on bipap and low dose levo, reassessment as charted   16:30 Pt to new room and placed on bipap , pt placed on vapotherm by RT and tolerating vapotherm well.   19:00 Handoff report given to  night nurse Osvaldo Blanca, pt stable @ handoff on vapotherm, levo, and vasopressin

## 2021-01-15 NOTE — PROGRESS NOTES
Patient with sudden desat, unable to recover, vapotherm increased to 40L/100% and still unable to get o2 sat >86%. BP drop to 76/35 (47), levophed increased, see MAR. MD notified.

## 2021-01-15 NOTE — PROGRESS NOTES
Speech Language Pathology  SLP Attempt Note: SLP attempting to see patient for dysphagia follow-up. In review of chart it appears the patient is experiencing increased O2 demands and is DNR-CC with no intubation requested by POA. H&P indicates aspiration in ED. In speaking with pt's nurse, Kwaku Ramírez, the patient aspirated emesis, not PO, therefore not necessarily indicative of swallow dysfunction. Despite, the patient is currently on Bi-PAP and was requiring Vapotherm at 40 L/min 100% FiO2 prior to BiPAP. RN reports pt needs to stay on BiPAP at this time. Will hold for now. Should the patient be placed back on Vapotherm, PO is not recommended. Peak airway pressure in patient's receiving 35 L/min or greater of O2 on HFNC can place patient at increased risk for food/liquid being blown into the airway and increase the risk of aspiration, especially in patient's with past hx of swallow dysfunction. Would also consider code status in conjunction for best recommendations to ensure comfort and safety. Plan to re-attempt, if able, tomorrow (1/16). Should the patient improve and eval be appropriate today, please call SLP. Thank you!     Srinath Diaz M.A., [facility]  Speech-Language Pathologist  Phone: [phone]

## 2021-01-15 NOTE — PROGRESS NOTES
01/14/21 7468   Oxygen Therapy/Pulse Ox   O2 Therapy Oxygen humidified   O2 Device Heated high flow cannula   O2 Flow Rate (L/min) 30 L/min   FiO2  100 %   Resp 28   SpO2 92 %   Skin Assessment Clean, dry, & intact

## 2021-01-15 NOTE — CARE COORDINATION
Case Management Assessment  Initial Evaluation      Patient Name: Refugio Wagner  YOB: 1935  Diagnosis: GI bleed [K92.2]  GI bleed [K92.2]  Date / Time: 1/14/2021 11:56 AM    Admission status/Date: 01/14/2021 Inpatient   Chart Reviewed: Yes      Patient Interviewed: No -pt in the ER and in Covid-19 isolation.  Covid-19 + 1/15/2021  Family Interviewed:  Yes - Pt's girlfriend Qiana Russel at [phone]    Hospitalization in the last 30 days:  No      Health Care Decision Maker :        Met with: Pt's girlfriend Eugenie Palaciodylan conducted  (bedside/phone): phone    Current PCP: Facility MD at [facility] ([place] [address])    Financial  Medicare and Medicaid  Precert required for SNF : N          3 night stay required - Y-unless waived due to covid-19    ADLS  Support Systems/Care Needs:  facility staff and SO  Transportation: EMS transportation    Meal Preparation: facility staff    Housing  Living Arrangements: Pt lives at [place] [address] long term [facility]  Steps: none  Intent for return to present living arrangements: Yes per Devan Steward   Identified Issues: none    [address] [place] [address] with [address] : No Agency:(Services)     Passport/Waiver : No  :                      Phone Number:    Passport/Waiver Services: n/a          Durable Medical Equiptment   DME Provider: provided by [facility]  Equipment:   Walker___Cane___RTS___ BSC___Shower Chair___Hospital Bed___W/C____Other________  02 at ____Liter(s)---wears(frequency)_______ HHN ___ CPAP___ BiPap___   N/A____      Home O2 Use :  Yes  -provided by [facility]  If No for home O2---if presently on O2 during hospitalization:  Yes  if yes CM to follow for potential DC O2 need  Informed of need for care provider to bring portable home O2 tank on day of discharge for nursing to connect prior to leaving:   Not Indicated  Verbalized agreement/Understanding:   Not Indicated    Community Service Affiliation  Dialysis:  No

## 2021-01-15 NOTE — ED NOTES
1906- Call placed to [facility] for on call Doctor.  Dr. Chase Diaz- Dr. Chasity Macias returned the call and spoke to [facility]  01/14/21 [address]  01/14/21 1925

## 2021-01-15 NOTE — CONSULTS
Reason for referral and CC: Respiratory failure, aspiration    HISTORY OF PRESENT ILLNESS: 82 yo male history of atrial fibrillation metastatic cancer and COPD presented from his NH to the emergency room with complaint of coffee-ground emesis. Unortunately aspirated and was severely hypoxic as well. He is not able to maintain his oxygenation with Vapotherm he was placed on BiPAP despite the aspiration risk. This is because the patient is a DNR CCA with no intubation requested by POA. POA did request that he continue to receive respiratory support other than intubation and vasopressors if needed. By me in the emergency room on BiPAP and on a Levophed drip. He was arousable to verbal or physical stimulus and could s use my hand but he was unable to answer questions or provide history. Past Medical History:   Diagnosis Date    A-fib [address] [address]     BPH (benign prostatic hyperplasia)     CAD (coronary artery disease)     Cancer (HCC)     skin, nose, ear, face    Cerebral artery occlusion with cerebral infarction (HCC)     tia's    COPD (chronic obstructive pulmonary disease) (HCC)     Hyperlipidemia     Hypertension     Shingles     2018     Past Surgical History:   Procedure Laterality Date    CHOLECYSTECTOMY      HERNIA REPAIR Right 1990    inguinal     Family History  family history includes Cancer in his father and mother. Social History:  reports that he has quit smoking. His smoking use included cigarettes. He smoked 0.50 packs per day. He has never used smokeless tobacco.   reports previous alcohol use. ALLERGIES:  Patient has No Known Allergies.   Continuous Infusions:   vasopressin (Septic Shock) infusion 0.04 Units/min (01/15/21 1253)    sodium bicarbonate infusion      norepinephrine 18 mcg/min (01/15/21 1230)    octreotide (SANDOSTATIN) infusion 25 mcg/hr (01/15/21 0915)     Scheduled Meds:   ipratropium-albuterol  1 ampule Inhalation Q4H WA    levetiracetam  1,000 mg Intravenous Q12H    methylPREDNISolone  60 mg Intravenous Q12H    amiodarone  200 mg Oral Daily    buPROPion  300 mg Oral QAM    busPIRone  15 mg Oral BID    ferrous sulfate  650 mg Oral Daily with breakfast    tamsulosin  0.4 mg Oral Nightly    cefepime  1 g Intravenous Q12H    sodium chloride flush  10 mL Intravenous 2 times per day    pantoprazole  40 mg Intravenous Q12H    And    sodium chloride (PF)  10 mL Intravenous Q12H     PRN Meds:  albuterol sulfate HFA, sodium chloride flush, promethazine **OR** ondansetron, acetaminophen **OR** acetaminophen      PHYSICAL EXAM: BP 99/68   Pulse 97   Temp 97.7 °F (36.5 °C) (Temporal)   Resp 28   Ht 5' 6\" (1.676 m)   Wt 162 lb (73.5 kg)   SpO2 96%   BMI 26.15 kg/m²  on Bipap  Constitutional:  IN acute distress. Eyes: PERRL. Conjunctivae anicteric. ENT: Normal nose. Normal tongue. Neck:  Trachea is midline. No thyroid tenderness. Respiratory: + accessory muscle usage. decreased breath sounds. No wheezes. No rales. +Rhonchi. Cardiovascular: Normal S1S2. No digit clubbing. No digit cyanosis. No LE edema. Gastrointestinal: No mass palpated. No tenderness palpated. No umbilical hernia. Lymphatic: No cervical or supraclavicular adenopathy. Skin: No rash on the exposed extremities. No Nodules or induration on exposed extremities. Psychiatric: No anxiety or Agitation.  Alert and Oriented to person only    CBC:   Recent Labs     01/14/21  1210 01/14/21  1850 01/15/21  0014 01/15/21  0610 01/15/21  1235   WBC 10.5 5.5  --  9.4  --    HGB 12.3* 9.6* 11.8* 11.1* 9.9*   HCT 37.8* 29.2* 35.5* 33.8* 30.0*   MCV 93.5 94.5  --  92.6  --     138  --  156  --      BMP:   Recent Labs     01/15/21  0014 01/15/21  0610 01/15/21  1320    135* 138   K 5.6* 5.3* 4.5    102 108   CO2 22 21 17*   PHOS  --   --  4.8   BUN 78* 84* 80*   CREATININE 2.3* 2.5* 2.5*        Recent Labs     01/14/21  1210   AST 22   ALT 13   LIPASE 6.0*   BILITOT 0.6   ALKPHOS 147* Recent Labs     01/14/21  1210   PROTIME 13.9*   INR 1.20*     No results for input(s): NITRITE, COLORU, PHUR, LABCAST, WBCUA, RBCUA, MUCUS, TRICHOMONAS, YEAST, BACTERIA, CLARITYU, SPECGRAV, LEUKOCYTESUR, UROBILINOGEN, BILIRUBINUR, BLOODU, GLUCOSEU, AMORPHOUS in the last 72 hours. Invalid input(s): Thalia Libman  Recent Labs     01/14/21  1712 01/15/21  0445   PHART 7.291* 7.268*   ROW7TOT 51.9* 44.7   PO2ART 52.5* 46.6*       Chest imaging was reviewed by me and showed CT ABD  Small bilateral pleural effusions with moderate opacity present   in each lower lobe most likely due to atelectasis although pneumonia is   possible.  No abnormal pulmonary vascular congestion.  Normal size of the   heart. Fluid-filled stomach and small bowel without definite obstruction. Gastroenteritis may be present.  Cirrhotic appearance of the liver without   splenomegaly.  Small amount of perihepatic ascites.  Small pelvic ascites. Small left and very small right pleural effusions with moderate bibasilar   pulmonary opacities either due to atelectasis or pneumonia.  Moderate   anasarca. ASSESSMENT:  · Acute hypoxic respiratory failure  · Aspiration pneumonia  · CORA  · Metabolic acidosis  · GI Bleed  · Acute metabolic encehalopathy  · Cancer, metastatic   · PAF  · COPD    PLAN:  · Bipap and /or vapotherm  · Change IV to 1/2 NS with bicarb at 100/hr  · Consult nephrology  · SQ Heparin for DVT prophylaxis  · unstable for EGD  · PPI gtt and Octreotide gtt  · CEF/Vanc D#1  · Serial Hgb  · Levophed and Vasopressin to maintain MAP > 65  · Solumedrol 30mg daily  · Duonebs  · PICC placed  · Due to at least single organ failure and risk of rapid deterioration, I spent 40 minutes of Critical care time reviewing labs/films, examining patient, collaborating with other physicians. This does not include time performing critical procedures.   · Per note, ok for intubation, HD not discussed yet    Thank you Rodriguez Burden MD for this consult

## 2021-01-15 NOTE — ED PROVIDER NOTES
I discussed the history, physical examination, laboratory and imaging studies, and treatment plan with Dr. Danielle Bernabe. Jaja Guzman was signed out to me in stable condition. Please see Dr. Vero Conner documentation for details of their history, physical, and laboratory studies. Upon re-examination, a summary of Trian Ibarra's history, physical examination, and studies are as follows: The patient is an 76-year-old male in respiratory distress. I was asked to evaluate him by CAMERON, as the patient currently is admitted to the hospital and decompensating here in the emergency department. The patient is noted to have hematemesis, and appears to have aspirated. He does have worsening infiltrates on chest x-ray, and has increased oxygen requirements here. The patient is currently on 6 L nasal cannula, recent blood gas shows that he is hypercapnic and likely worsening in his mentation. On evaluation of the patient, he is ill-appearing. He does say his name and birthdate, but otherwise is disoriented and moans incoherently. He has diffuse rhonchi, he is tachypneic with some accessory muscle use. His heart is a tachycardic rate and regular rhythm. I reviewed his lab work and clinical scenario. With his worsening respiratory status and increased oxygen requirements with hypercapnia, I did suggest that the patient be intubated. I do see that he is a DNR CCA, so we wanted to honor the patient's wishes. Therefore, RN and CAMERON spoke with the patient's power of  and longtime domestic partner. She states that he would not want to be on a ventilator, does not want life support devices. She does understand that this could potentially result in worsening respiratory failure and death, and she is understanding of this and maintains that he would not want to be intubated. Therefore the patient is placed on Vapotherm. He is on 80% FiO2, and is maintaining oxygen saturations in the 90s. Again he does appear ill. 20 mg/dL    CREATININE 2.3 (H) 0.8 - 1.3 mg/dL    GFR Non-African American 27 (A) >60    GFR  33 (A) >60    Calcium 8.1 (L) 8.3 - 10.6 mg/dL    Total Protein 5.3 (L) 6.4 - 8.2 g/dL    Alb 2.8 (L) 3.4 - 5.0 g/dL    Albumin/Globulin Ratio 1.1 1.1 - 2.2    Total Bilirubin 0.6 0.0 - 1.0 mg/dL    Alkaline Phosphatase 147 (H) 40 - 129 U/L    ALT 13 10 - 40 U/L    AST 22 15 - 37 U/L    Globulin 2.5 g/dL   Protime-INR   Result Value Ref Range    Protime 13.9 (H) 10.0 - 13.2 sec    INR 1.20 (H) 0.86 - 1.14   APTT   Result Value Ref Range    aPTT 29.2 24.2 - 36.2 sec   Blood occult stool #1   Result Value Ref Range    Occult Blood Diagnostic Result: POSITIVE  Normal range: Negative   (A)    Troponin   Result Value Ref Range    Troponin 0.03 (H) <0.01 ng/mL   Lipase   Result Value Ref Range    Lipase 6.0 (L) 13.0 - 60.0 U/L   Brain Natriuretic Peptide   Result Value Ref Range    Pro- (H) 0 - 449 pg/mL   Blood gas, arterial   Result Value Ref Range    pH, Arterial 7.291 (L) 7.350 - 7.450    pCO2, Arterial 51.9 (H) 35.0 - 45.0 mmHg    pO2, Arterial 52.5 (L) 75.0 - 108.0 mmHg    HCO3, Arterial 24.5 21.0 - 29.0 mmol/L    Base Excess, Arterial -2.4 -3.0 - 3.0 mmol/L    Hemoglobin, Art, Extended 10.4 (L) 13.5 - 17.5 g/dL    O2 Sat, Arterial 82.9 (L) >92 %    Carboxyhgb, Arterial 0.3 0.0 - 1.5 %    Methemoglobin, Arterial 0.3 <1.5 %    TCO2, Arterial 26.0 Not Established mmol/L    O2 Content, Arterial 12 Not Established mL/dL    O2 Therapy See comment    CBC Auto Differential   Result Value Ref Range    WBC 5.5 4.0 - 11.0 K/uL    RBC 3.09 (L) 4.20 - 5.90 M/uL    Hemoglobin 9.6 (L) 13.5 - 17.5 g/dL    Hematocrit 29.2 (L) 40.5 - 52.5 %    MCV 94.5 80.0 - 100.0 fL    MCH 31.0 26.0 - 34.0 pg    MCHC 32.8 31.0 - 36.0 g/dL    RDW 17.1 (H) 12.4 - 15.4 %    Platelets 558 799 - 443 K/uL    MPV 9.0 5.0 - 10.5 fL    Neutrophils % 91.4 %    Lymphocytes % 3.6 %    Monocytes % 4.8 %    Eosinophils % 0.1 %    Basophils % 0.1 %    Neutrophils Absolute 5.0 1.7 - 7.7 K/uL    Lymphocytes Absolute 0.2 (L) 1.0 - 5.1 K/uL    Monocytes Absolute 0.3 0.0 - 1.3 K/uL    Eosinophils Absolute 0.0 0.0 - 0.6 K/uL    Basophils Absolute 0.0 0.0 - 0.2 K/uL   Hemoglobin and Hematocrit, Blood   Result Value Ref Range    Hemoglobin 11.8 (L) 13.5 - 17.5 g/dL    Hematocrit 35.5 (L) 40.5 - 52.5 %   Troponin   Result Value Ref Range    Troponin 0.02 (H) <0.01 ng/mL   Basic Metabolic Panel   Result Value Ref Range    Sodium 137 136 - 145 mmol/L    Potassium 5.6 (H) 3.5 - 5.1 mmol/L    Chloride 103 99 - 110 mmol/L    CO2 22 21 - 32 mmol/L    Anion Gap 12 3 - 16    Glucose 149 (H) 70 - 99 mg/dL    BUN 78 (H) 7 - 20 mg/dL    CREATININE 2.3 (H) 0.8 - 1.3 mg/dL    GFR Non-African American 27 (A) >60    GFR  33 (A) >60    Calcium 7.1 (L) 8.3 - 10.6 mg/dL   EKG 12 Lead   Result Value Ref Range    Ventricular Rate 93 BPM    Atrial Rate 93 BPM    P-R Interval 164 ms    QRS Duration 92 ms    Q-T Interval 358 ms    QTc Calculation (Bazett) 445 ms    P Axis 50 degrees    R Axis 61 degrees    T Axis 90 degrees    Diagnosis       Normal sinus rhythmAnterior infarct , age undeterminedNonspecific ST abnormalityAbnormal ECGConfirmed by LAKEISHA Monroy MD ([id number]) on 1/14/2021 5:36:08 PM   TYPE AND SCREEN   Result Value Ref Range    ABO/Rh A POS     Antibody Screen NEG    PREPARE RBC (CROSSMATCH), 1 Units   Result Value Ref Range    Product Code Blood Bank [id number]     Description Blood Bank Red Blood Cells, Leuko-reduced     Unit Number [id number]     Dispense Status Blood Bank released     Product Code Blood Bank [id number]     Description Blood Bank Red Blood Cells, Leuko-reduced     Unit Number [id number]     Dispense Status Blood Bank transfused      Ct Abdomen Pelvis Wo Contrast    Result Date: 1/14/2021  EXAMINATION: CT OF THE ABDOMEN AND PELVIS WITHOUT CONTRAST 1/14/2021 2:09 pm TECHNIQUE: CT of the abdomen and pelvis was performed without the administration of intravenous contrast. Multiplanar reformatted images are provided for review. Dose modulation, iterative reconstruction, and/or weight based adjustment of the mA/kV was utilized to reduce the radiation dose to as low as reasonably achievable. COMPARISON: Portable chest 01/14/2021 HISTORY: ORDERING SYSTEM PROVIDED HISTORY: abd pain, gi bleed TECHNOLOGIST PROVIDED HISTORY: Reason for exam:->abd pain, gi bleed Reason for Exam: ABD PAIN/ POS. GI BLEED Acuity: Acute Type of Exam: Initial FINDINGS: Lower Chest: Small bilateral pleural effusions with moderate opacity present in each lower lobe most likely due to atelectasis although pneumonia is possible. No abnormal pulmonary vascular congestion. Normal size of the heart. Organs: Liver has a lobular contour. No worrisome focal lesion. Large amount of intra biliary air in the left lobe. Gallbladder surgically absent. No biliary stent in place. Mild diffuse enlargement of the adrenal glands compatible with benign hyperplasia. Tiny nonobstructing calculus questioned in the mid left kidney. Other abdominal organs appear normal. GI/Bowel: Stomach full of fluid. Small bowel also moderately distended and fluid-filled. Mild gaseous distention of the colon. Moderate rectal stool with appearance of impaction. Moderate sigmoid diverticulosis without evident diverticulitis. Severe diverticulosis of the descending colon also present. Distal small bowel less distended than the proximal small bowel. No transition zone identified. Pelvis: Pappas catheter in the bladder. Mild enlargement of the prostate. Small amount of pelvic ascites. No adenopathy. Peritoneum/Retroperitoneum: Severe atherosclerotic disease. No aneurysm. No retroperitoneal adenopathy. Mild amount of perihepatic ascites. No free air. Bones/Soft Tissues: Moderate anasarca. Severe lumbar spondylosis. Severe compression fracture of T11. Mild fracture of L4 L1 T9.   No acute fracture lines identified. Severe osteoporosis. Fluid-filled stomach and small bowel without definite obstruction. Gastroenteritis may be present. Cirrhotic appearance of the liver without splenomegaly. Small amount of perihepatic ascites. Small pelvic ascites. Small left and very small right pleural effusions with moderate bibasilar pulmonary opacities either due to atelectasis or pneumonia. Moderate anasarca. Xr Chest Portable    Result Date: 1/14/2021  EXAMINATION: ONE XRAY VIEW OF THE CHEST 1/14/2021 1:35 pm COMPARISON: None. HISTORY: ORDERING SYSTEM PROVIDED HISTORY: chest pain TECHNOLOGIST PROVIDED HISTORY: Reason for exam:->chest pain Reason for Exam: chest pain Acuity: Acute Type of Exam: Initial FINDINGS: Cardiomegaly. Bibasilar hypoaeration. Linear opacities seen in the right parahilar region. No pneumothorax. No pleural effusion. 1. Cardiomegaly 2. Bibasilar hypoaeration 3.  Linear opacities right parahilar region could represent scarring, atelectasis or pneumonia        [facility], DO  01/15/21 [address] [place] [address] , DO  01/15/21 0149

## 2021-01-15 NOTE — PROGRESS NOTES
Prevention dressing applied to bridge of nose and other facial bony prominences upon BiPAP/CPAP initiation. Consulted RN regarding the assessment of skin integrity.

## 2021-01-16 NOTE — PROGRESS NOTES
Speech Language Pathology  Reason Patient Not Seen       NAME: Phoebe Bence  : 1935   MRN: [id number]  ADMISSION DATE: 2021  ADMITTING DIAGNOSIS: has GI bleed; Acute respiratory failure with hypoxia ([address] Pneumonia, unspecified organism; and CORA (acute kidney injury) ([facility] [address] on their problem list.      SLP spoke with Rn/Cindi. Patient with GI work up for GI bleed, poor level of alertness this morning and difficulty to arouse, on Levo. Requiring Vapotherm, 30liters @ 50%. Patient is not appropriate for SLP intervention or evaluation as this time. SLP will re-attempt as patient becomes appropriate.       Mari Perdomo MS CCC-SLP  [place] .[id number]  Speech Language Pathologist  2021

## 2021-01-16 NOTE — PROGRESS NOTES
Telephone consent for blood/blood products received by Cedric Womack, patient's significant other. Consent obtained by writer and Yamile Mccormick R.N. placed in patient's chart. Pt not following any commands at this time, although father reports a few days ago she squeezed his hand to command.

## 2021-01-16 NOTE — PROGRESS NOTES
01/16/21 0351   Oxygen Therapy/Pulse Ox   O2 Therapy Oxygen humidified   O2 Device Heated high flow cannula   O2 Flow Rate (L/min) 40 L/min   FiO2  50 %  (decreased to 45)   SpO2 98 %

## 2021-01-16 NOTE — PROGRESS NOTES
RESPIRATORY THERAPY ASSESSMENT    Name:  [facility] Record Number:  [id number]  Age: 81 y.o. Gender: male  : 1935  Today's Date:  1/15/2021  Room:  [place] [address]    Assessment     Is the patient being admitted for a COPD or Asthma exacerbation?  no  (If yes the patient will be seen every 4 hours for the first 24 hours and then reassessed)    Patient Admission Diagnosis      Allergies  No Known Allergies    Minimum Predicted Vital Capacity:               Actual Vital Capacity:                    Pulmonary History:COPD  Home Oxygen Therapy:  n/a  Home Respiratory Therapy:Albuterol and Vilanterol/Fluticasone Furoate   Current Respiratory Therapy:  duoneb q4          Respiratory Severity Index(RSI)   Patients with orders for inhalation medications, oxygen, or any therapeutic treatment modality will be placed on Respiratory Protocol. They will be assessed with the first treatment and at least every 72 hours thereafter. The following severity scale will be used to determine frequency of treatment intervention.     Smoking History: Pulmonary Disease or Smoking History, Greater than 15 pack year = 2    Social History  Social History     Tobacco Use    Smoking status: Former Smoker     Packs/day: 0.50     Types: Cigarettes    Smokeless tobacco: Never Used   Substance Use Topics    Alcohol use: Not Currently     Comment: not for years    Drug use: Never       Recent Surgical History: None = 0  Past Surgical History  Past Surgical History:   Procedure Laterality Date    CHOLECYSTECTOMY      HERNIA REPAIR Right 1990    inguinal       Level of Consciousness: Lethargic = 3    Level of Activity: Bedridden, unresponsive or quadriplegic = 4    Respiratory Pattern: Increased; RR 21-30 = 1    Breath Sounds: Diminshed bilaterally and/or crackles = 2    Sputum   ,  ,    Cough: Strong, spontaneous, non-productive = 0    Vital Signs   BP (!) 140/88   Pulse 87   Temp 97.1 °F (36.2 °C)   Resp 26   Ht 5' 6\" will be delivered via Metered Dose Inhaler (MDI), HHN, Aerogen to intubated patients on mechanical ventilation. 6. Inhalation medication orders will be delivered and/or substituted as outlined below. Aerosolized Medications Ordering and Administration Guidelines:    1. All Medications will be ordered by a physician, and their frequency and/or modality will be adjusted as defined by the patients Respiratory Severity Index (RSI) score. 2. If the patient does not have documented COPD, consider discontinuing anticholinergics when RSI is less than 9.  3. If the bronchospasm worsens (increased RSI), then the bronchodilator frequency can be increased to a maximum of every 4 hours. If greater than every 4 hours is required, the physician will be contacted. 4. If the bronchospasm improves, the frequency of the bronchodilator can be decreased, based on the patient's RSI, but not less than home treatment regimen frequency. 5. Bronchodilator(s) will be discontinued if patient has a RSI less than 9 and has received no scheduled or as needed treatment for 72  Hrs. Patients Ordered on a Mucolytic Agent:    1. Must always be administered with a bronchodilator. 2. Discontinue if patient experiences worsened bronchospasm, or secretions have lessened to the point that the patient is able to clear them with a cough. Anti-inflammatory and Combination Medications:    1. If the patient lacks prior history of lung disease, is not using inhaled anti-inflammatory medication at home, and lacks wheezing by examination or by history for at least 24 hours, contact physician for possible discontinuation.

## 2021-01-16 NOTE — PROGRESS NOTES
Contacted RT for patient unable to clear thick secretions, oxygen saturation 86% on 30 L/Fi02 45%. RT NT suctioned patient, and placed patient on 30 L/Fi02 70% to assist in recovery after suctioning. Moderate amount of thick coffee ground appearance output suctioned from airway.

## 2021-01-16 NOTE — CONSULTS
Thank you to requesting provider: Dr. Fonnie Spatz , for asking us to see Meliza Sivan  Reason for consultation:   Acute Kidney Injury   Chief Complaint:  Emesis     History of Presenting Illness      80 y/o with history of metastatic cancer and COPD admitted from [facility] with emesis. He is COVID + and he is DNR. He was hypotensive initially and now on pressors. We have been asked to see him for acute kidney injury. Scr of 2.8. Over the last shift he started to make some urine. Now on vapotherm. Past Medical/Surgical History      Active Ambulatory Problems     Diagnosis Date Noted    Acute respiratory failure with hypoxia (HCC)     Pneumonia, unspecified organism     CORA (acute kidney injury) ([facility] [address]      Resolved Ambulatory Problems     Diagnosis Date Noted    No Resolved Ambulatory Problems     Past Medical History:   Diagnosis Date    A-fib ([facility] [address]     BPH (benign prostatic hyperplasia)     CAD (coronary artery disease)     Cancer (HCC)     Cerebral artery occlusion with cerebral infarction ([facility] [address]     COPD (chronic obstructive pulmonary disease) (HCC)     Hyperlipidemia     Hypertension     Shingles          Review of Systems     Constitutional:  No weight loss, no fever/chills, + pain   Eyes:  No eye pain, no eye redness  Cardiovascular:  No chest pain, no worsening of edema  Respiratory:  No hemoptysis, + SOB  Gastrointestinal:  No blood in stool, no n/v, no diarrhea  Genitoruinary:  No hematuria, no difficulty with urination  Musculoskeletal:  No joint swelling, no redness  Integumentary:  No Rash, no itching  Neurological:  No focal weakness, No new sensory deficit  Psychiatric:  No depression, + confusion  Endocrine:  No polyuria, no polydipsia       Medications      Reviewed in EMR     Allergies     Patient has no known allergies.       Family History       Negative for Kidney Disease    Social History      Social History     Socioeconomic History    Marital status: Single Spouse name: None    Number of children: None    Years of education: None    Highest education level: None   Occupational History    None   Social Needs    Financial resource strain: None    Food insecurity     Worry: None     Inability: None    Transportation needs     Medical: None     Non-medical: None   Tobacco Use    Smoking status: Former Smoker     Packs/day: 0.50     Types: Cigarettes    Smokeless tobacco: Never Used   Substance and Sexual Activity    Alcohol use: Not Currently     Comment: not for years    Drug use: Never    Sexual activity: None   Lifestyle    Physical activity     Days per week: None     Minutes per session: None    Stress: None   Relationships    Social connections     Talks on phone: None     Gets together: None     Attends Jew service: None     Active member of club or organization: None     Attends meetings of clubs or organizations: None     Relationship status: None    Intimate partner violence     Fear of current or ex partner: None     Emotionally abused: None     Physically abused: None     Forced sexual activity: None   Other Topics Concern    None   Social History Narrative    None       Physical Exam     Blood pressure (!) 124/99, pulse 74, temperature 97.2 °F (36.2 °C), temperature source Temporal, resp. rate 23, height 5' 6\" (1.676 m), weight 162 lb (73.5 kg), SpO2 97 %.     General:  Ill appearing   HEENT:  PERRL, EOMI  Neck:  Supple, Right IJ   Chest:  Vapotherm with rhonchi, no wheezing   CV:  Regular, no rub   Abdomen:  NTND, soft, +BS, no hepatosplenomegaly  Extremities:  + peripheral edema  Neurological:  Moving all four extremities, CN II-XII grossly intact  Lymphatics:  No palpable lymph nodes  Skin:  No rash, no jaundice  Psychiatric:  Normal insight and judgement, good recall    Data     Recent Labs     01/14/21  1850 01/14/21  1850 01/15/21  0610 01/15/21  0610 01/15/21  1927 01/16/21  0010 01/16/21  0519   WBC 5.5  --  9.4  --   --   -- 11.1*   HGB 9.6*   < > 11.1*   < > 8.9* 8.8* 8.7*   HCT 29.2*   < > 33.8*   < > 27.7* 27.2* 26.3*   MCV 94.5  --  92.6  --   --   --  91.2     --  156  --   --   --  96*    < > = values in this interval not displayed. Recent Labs     01/15/21  0610 01/15/21  1320 01/16/21  0519   * 138 135*   K 5.3* 4.5 5.1    108 103   CO2 21 17* 21   GLUCOSE 153* 152* 126*   PHOS  --  4.8 5.1*   BUN 84* 80* 92*   CREATININE 2.5* 2.5* 2.8*   LABGLOM 25* 25* 22*   GFRAA 30* 30* 26*       Assessment:    Acute Kidney Injury:  KDIGO stage 2  - Etiology:  Likely pre-renal to ATN.   COVID documented in a number of renal pathologies   - Clinical:  Electrolytes are ok, he is starting to make more urine with IV fluids    GI Bleed   GI following   On Octreotide and PPI    Respiratory Failure   + COVID   On Vapotherm and he is DNI    Metabolic Acidosis   Due to kidney failure   Improving with HCO3 gtt    Plan:    Continue HCO3 gtt  Poor prognosis, high risk to decompensated further   Not a good candidate for dialysis, hopefully he will reach plateau phase with fluid and hemodynamic support  Follow closely    Thank you for asking us to participate in the management of your patient, please do not hesitate to contact me for any concerns regarding my recommendations as outlined above.    -----------------------------  Booker Pace M.D.   [facility]

## 2021-01-16 NOTE — PROGRESS NOTES
Patient incontinent of medium soft maroon colored stool. Hygiene needs completed and patient repositioned utilizing pillow support. Patient required an increase of Fi02 to 55% to maintain 89-90% oxygen saturation while lying right side for short duration. Returned patient to 45% Fi02 after positioning patient supine with bilateral arms elevated on pillows. Call light in reach. Will continue to monitor.

## 2021-01-16 NOTE — PROGRESS NOTES
01/15/21 6755   Oxygen Therapy/Pulse Ox   O2 Therapy Oxygen humidified   O2 Device Heated high flow cannula   O2 Flow Rate (L/min) 40 L/min   FiO2  60 %  (decreased to 50)   SpO2 97 %

## 2021-01-16 NOTE — PROGRESS NOTES
Progress Note    Admit Date:  1/14/2021    Subjective:  Mr. Waldo Nguyen is currently on vapotherm  On Levophed     COVID positive    Objective:   Patient Vitals for the past 4 hrs:   BP Temp Temp src Pulse Resp SpO2   01/16/21 1415 105/71 97.4 °F (36.3 °C) Temporal 82 23 94 %   01/16/21 1338 (!) 92/56 -- -- 94 -- --   01/16/21 1221 126/77 -- -- 90 -- --   01/16/21 1110 -- -- -- -- -- 95 %            Intake/Output Summary (Last 24 hours) at 1/16/2021 1446  Last data filed at 1/16/2021 1143  Gross per 24 hour   Intake 3629 ml   Output 545 ml   Net 3084 ml       Physical Exam:    General appearance:  No apparent distress, appears stated age, minimally responsive, no distress, on vapotherm  HEENT:  Normal cephalic, atraumatic without obvious deformity. Pupils equal, round  Conjunctivae/corneas clear. Neck: Supple, with full range of motion. No jugular venous distention. Trachea midline. Respiratory:  Normal respiratory effort   Cardiovascular:  Regular rhythm, tachy rate  Abdomen: Soft, non-tender, non-distended with normal bowel sounds. Musculoskeletal:  No clubbing, cyanosis or edema bilaterally   Skin: Skin color, texture, turgor normal.   Neuro. -Minimally responsive. Able to obey simple commands.     Scheduled Meds:   dexamethasone  6 mg Intravenous Q6H    [START ON 1/17/2021] remdesivir IVPB  100 mg Intravenous Q24H    busPIRone  15 mg Oral BID    levetiracetam  500 mg Intravenous Q12H    Venelex   Topical BID    amiodarone  200 mg Oral Daily    buPROPion  300 mg Oral QAM    ferrous sulfate  650 mg Oral Daily with breakfast    tamsulosin  0.4 mg Oral Nightly    cefepime  1 g Intravenous Q12H    sodium chloride flush  10 mL Intravenous 2 times per day    pantoprazole  40 mg Intravenous Q12H    And    sodium chloride (PF)  10 mL Intravenous Q12H       Continuous Infusions:   sodium chloride      sodium bicarbonate infusion 100 mL/hr at 01/16/21 1144    norepinephrine 3 mcg/min (01/16/21 1002)    octreotide (SANDOSTATIN) infusion 25 mcg/hr (01/15/21 0915)       PRN Meds:  sodium chloride, sodium chloride, albuterol-ipratropium, ipratropium-albuterol, albuterol sulfate HFA, sodium chloride flush, promethazine **OR** ondansetron, acetaminophen **OR** acetaminophen      Data:  CBC:   Recent Labs     01/14/21  1850 01/14/21  1850 01/15/21  0610 01/15/21  0610 01/15/21  1927 01/16/21  0010 01/16/21  0519   WBC 5.5  --  9.4  --   --   --  11.1*   HGB 9.6*   < > 11.1*   < > 8.9* 8.8* 8.7*   HCT 29.2*   < > 33.8*   < > 27.7* 27.2* 26.3*   MCV 94.5  --  92.6  --   --   --  91.2     --  156  --   --   --  96*    < > = values in this interval not displayed. BMP:   Recent Labs     01/15/21  0610 01/15/21  1320 01/16/21  0519   * 138 135*   K 5.3* 4.5 5.1    108 103   CO2 21 17* 21   PHOS  --  4.8 5.1*   BUN 84* 80* 92*   CREATININE 2.5* 2.5* 2.8*     LIVER PROFILE:   Recent Labs     01/14/21  1210   AST 22   ALT 13   LIPASE 6.0*   BILITOT 0.6   ALKPHOS 147*     PT/INR:   Recent Labs     01/14/21  1210   PROTIME 13.9*   INR 1.20*       CULTURES  None     RADIOLOGY    IR FLUORO GUIDED CVA DEVICE PLMT/REPLACE/REMOVAL   Final Result   Successful ultrasound and fluoroscopy guided non-tunneled right internal   jugular catheter placement. CT ABDOMEN PELVIS WO CONTRAST   Final Result   Fluid-filled stomach and small bowel without definite obstruction. Gastroenteritis may be present. Cirrhotic appearance of the liver without   splenomegaly. Small amount of perihepatic ascites. Small pelvic ascites. Small left and very small right pleural effusions with moderate bibasilar   pulmonary opacities either due to atelectasis or pneumonia. Moderate   anasarca. XR CHEST PORTABLE   Final Result   1. Cardiomegaly   2. Bibasilar hypoaeration   3.  Linear opacities right parahilar region could represent scarring,   atelectasis or pneumonia           Assessment/Plan:    Septic Shock  Aspiration

## 2021-01-16 NOTE — PROGRESS NOTES
Patient incontinent of second maroon stool this shift. Hygiene needs completed. Venelex again applied to DTI to coccyx/R. Buttock and to penis as ordered. Patient repositioned utilizing x3 pillows. SCD pumps replaced. Bed alarm on. Will continue to monitor.

## 2021-01-16 NOTE — PROGRESS NOTES
H/H scheduled to be drawn at 1830, re-timed next draw due to patient currently receiving plasma. Next H/H to be drawn at 2030.

## 2021-01-16 NOTE — PROGRESS NOTES
Pulmonary & Critical Care Medicine ICU Progress Note    CC: Respiratory failure    Events of Last 24 hours: TOlerating Vapotherm, COVID +  Levophed at 3    Invasive Lines: PICC    MV:  D     / / /FiO2 : (S) 50 %(decreased to 45)  Recent Labs     01/15/21  0445 01/15/21  1413   PHART 7.268* 7.327*   CRE0XCA 44.7 36.1   PO2ART 46.6* 69.2*       IV:   vasopressin (Septic Shock) infusion 0.04 Units/min (01/16/21 0203)    sodium bicarbonate infusion 100 mL/hr at 01/16/21 0140    norepinephrine 7 mcg/min (01/16/21 0550)    octreotide (SANDOSTATIN) infusion 25 mcg/hr (01/15/21 0915)       Vitals:  BP (!) 139/95   Pulse 86   Temp 96.8 °F (36 °C) (Temporal)   Resp 28   Ht 5' 6\" (1.676 m)   Wt 162 lb (73.5 kg)   SpO2 95%   BMI 26.15 kg/m²   on Vapotherm 55%    Intake/Output Summary (Last 24 hours) at 1/16/2021 0733  Last data filed at 1/15/2021 1600  Gross per 24 hour   Intake 367.32 ml   Output 100 ml   Net 267.32 ml     EXAM:  Constitutional: ill appearing. In distress. Eyes: PERRL. No sclera icterus. ENT: Normal Nose. Normal Tongue. Neck:  Trachea is midline. No thyroid tenderness. Respiratory: + accessory muscle usage. No wheezes. No rales. + Rhonchi. Cardiovascular: Normal S1S2. No murmur. No digit cyanosis. No digit clubbing. No LE edema. GI: Non-tender. Non-distended. Normal bowel sounds. No masses. No umbilical hernia. Skin: No rash on the exposed extremities. No Nodules on exposed extremities. Neuro: Drowsy but awakens to voice. ONly oriented to self. Moves all extremities. Psych: No agitation, no anxiety.     Scheduled Meds:   levetiracetam  500 mg Intravenous Q12H    Venelex   Topical BID    albuterol-ipratropium  1 puff Inhalation 6 times per day    amiodarone  200 mg Oral Daily    buPROPion  300 mg Oral QAM    busPIRone  15 mg Oral BID    ferrous sulfate  650 mg Oral Daily with breakfast    tamsulosin  0.4 mg Oral Nightly    cefepime  1 g Intravenous Q12H    sodium chloride flush  10 mL Intravenous 2 times per day    pantoprazole  40 mg Intravenous Q12H    And    sodium chloride (PF)  10 mL Intravenous Q12H     PRN Meds:  ipratropium-albuterol, albuterol sulfate HFA, sodium chloride flush, promethazine **OR** ondansetron, acetaminophen **OR** acetaminophen    Results:  CBC:   Recent Labs     01/14/21  1210 01/14/21  1850 01/14/21  1850 01/15/21  0610 01/15/21  0610 01/15/21  1927 01/16/21  0010 01/16/21  0519   WBC 10.5 5.5  --  9.4  --   --   --  11.1*   HGB 12.3* 9.6*   < > 11.1*   < > 8.9* 8.8* 8.7*   HCT 37.8* 29.2*   < > 33.8*   < > 27.7* 27.2* 26.3*   MCV 93.5 94.5  --  92.6  --   --   --  91.2    138  --  156  --   --   --   --     < > = values in this interval not displayed. BMP:   Recent Labs     01/15/21  0610 01/15/21  1320 01/16/21  0519   * 138 135*   K 5.3* 4.5 5.1    108 103   CO2 21 17* 21   PHOS  --  4.8 5.1*   BUN 84* 80* 92*   CREATININE 2.5* 2.5* 2.8*     LIVER PROFILE:   Recent Labs     01/14/21  1210   AST 22   ALT 13   LIPASE 6.0*   BILITOT 0.6   ALKPHOS 147*     1/14/21 COVID PCR +    Chest imaging was reviewed by me and showed CT ABD  Small bilateral pleural effusions with moderate opacity present   in each lower lobe most likely due to atelectasis although pneumonia is   possible.  No abnormal pulmonary vascular congestion.  Normal size of the   heart.      Fluid-filled stomach and small bowel without definite obstruction. Gastroenteritis may be present.  Cirrhotic appearance of the liver without   splenomegaly.  Small amount of perihepatic ascites.  Small pelvic ascites.    Small left and very small right pleural effusions with moderate bibasilar   pulmonary opacities either due to atelectasis or pneumonia.  Moderate   anasarca.         ASSESSMENT:  · Acute hypoxic respiratory failure  · COVID 19 pneumonia  · Aspiration pneumonia  · CORA  · Metabolic acidosis  · GI Bleed  · Acute metabolic encehalopathy  · Cancer, metastatic · PAF  · COPD     PLAN:  · Droplet +  Precautions  · Vapotherm  · Decadron D#2  · Low GFR, no Remdesivir, had 1 dose  · CCP today  · 1/2 NS with bicarb at 100/hr  · Consult nephrology - pt may need HD but might night not be a great candidate given DNRCCA and metastatic cancer  · SQ Heparin for DVT prophylaxis  · unstable for EGD  · PPI gtt and Octreotide gtt  · CEF/Vanc D#2  · Serial Hgb  · Levophed and Vasopressin to maintain MAP > 65  · Combvent     Total critical care time caring for this patient with life threatening, unstable organ failure, including direct patient contact, management of life support systems, review of data including imaging and labs, discussions with other team members and physicians at least 35 minutes so far today, excluding procedures.

## 2021-01-16 NOTE — CONSULTS
Pharmacy to enter Remdesivir Protocol:  Please give 200mg IVPB x1 this AM followed by 100mg IVPB Q24hrs x4 additional doses starting tomorrow morning. Continue to monitor Renal function, LFTs and O2 requirements.   Shasta Ruby PharmD 1/16/2021 8:19 AM

## 2021-01-17 NOTE — PROGRESS NOTES
01/17/21 1601   NIV Type   Equipment Type V60   Mode Bilevel   Mask Type Full face mask   Mask Size Medium   Settings/Measurements   IPAP 16 cmH20   CPAP/EPAP 8 cmH2O   Rate Ordered 14   Resp (!) 32   FiO2  100 %   Vt Exhaled 564 mL   Comfort Level Fair   Alarm Settings   Alarms On Y   Press Low Alarm 8 cmH2O   High Pressure Alarm 35 cmH2O   Delay Alarm 20 sec(s)   Resp Rate Low Alarm 12   High Respiratory Rate 35 br/min

## 2021-01-17 NOTE — PROGRESS NOTES
Patient SpO2 dropped down to 43%. Vapotherm increased and NRB placed. RRT notified. Placed patient on BiPap 16/8 100%. Patient is now 96%.

## 2021-01-17 NOTE — PROGRESS NOTES
Progress Note    HISTORY     CC:   COVID19 pneumonia with septic shock            We are following for acute kidney injury       Subjective/   HPI:  Remains in critical condition. Decreased vapotherm requirements. On IV fluids and coming off pressors. Renal labs are stable. ROS:  Constitutional:  No fevers, No Chills, + weakness  Cardiovascular:  No palpations, ++ edema  Respiratory:  No wheezing, no cough  Skin:  No rash, no itching  :  No hematuria, Pappas in place    Social Hx:  No family at bedside     Past Medical and Surgical History:  - Reviewed, no changes     EXAM       Objective/     Vitals:    01/17/21 0457 01/17/21 0829 01/17/21 0852 01/17/21 1105   BP: 123/78 127/77  107/77   Pulse:  95  121   Resp:  26  28   Temp:  98.1 °F (36.7 °C)  98.2 °F (36.8 °C)   TempSrc:  Temporal  Temporal   SpO2:  95% 94%    Weight:       Height:         24HR INTAKE/OUTPUT:      Intake/Output Summary (Last 24 hours) at 1/17/2021 1122  Last data filed at 1/17/2021 9834  Gross per 24 hour   Intake 3155 ml   Output 250 ml   Net 2905 ml     Constitutional:  Ill appearing   Eyes:  Pupils reactive, sclera clear   Neck:  Normal thyroid, no masses   Cardiovascular:  Regular, no rub  Respiratory:  No distress, no wheezing  Psychiatry:  Flat Appropriate mood/affect, wake, minimal interaction   Abdomen: +bs, soft, nt, no masses, abdominal wall edema   Musculoskeletal: ++ LE edema, no clubbing   Lymphatics:  No LAD in neck, no supraclavicular nodes       MEDICAL DECISION MAKING       Data/  Recent Labs     01/15/21  0610 01/15/21  0610 01/16/21  0519 01/16/21  2044 01/17/21  0240 01/17/21  0615   WBC 9.4  --  11.1*  --   --  6.2   HGB 11.1*   < > 8.7* 8.3* 8.0* 8.2*   HCT 33.8*   < > 26.3* 25.2* 24.0* 25.0*   MCV 92.6  --  91.2  --   --  92.7     --  96*  --   --  59*    < > = values in this interval not displayed.      Recent Labs     01/15/21  1320 01/16/21  0519 01/17/21  0615    135* 139   K 4.5 5.1 4.8  4.8    103 104   CO2 17* 21 22   GLUCOSE 152* 126* 104*   PHOS 4.8 5.1* 4.9   BUN 80* 92* 89*   CREATININE 2.5* 2.8* 2.2*   LABGLOM 25* 22* 29*   GFRAA 30* 26* 35*       Assessment/        Acute Kidney Injury:  KDIGO stage 2  - Etiology:  Likely pre-renal to ATN. COVID documented in a number of renal pathologies with here with septic shock and hypotension in setting of home ARB use   - Clinical:  Electrolytes are ok, he is starting to make more urine with IV fluids and BP is now stable. Conservative management is best      GI Bleed              GI following              On Octreotide and PPI   Unstable for EGD      Respiratory Failure              + COVID              On Vapotherm   Decadron, convalescent plasma     Septic Shock:   COVID19 pneumonia   Off pressors today      Metabolic Acidosis              Due to kidney failure              Improving with HCO3 gtt    Plan/     We can d/c IV fluids  Follow labs  Hemodynamic support  ICU support for COVID19 pneumonia with antibiotics  Would not be a good candidate for dialysis.   Hopefully continues to improve with treatment of sepsis  PRN lasix would be ok   -----------------------------  Yue Rodney M.D.   [facility]

## 2021-01-17 NOTE — PROGRESS NOTES
Progress Note    Admit Date:  1/14/2021    Subjective:  Mr. Dwayne Doherty is currently on vapotherm 30 L, FiO2 50%  Off Levo    COVID positive    Objective:   Patient Vitals for the past 4 hrs:   BP Temp Temp src Pulse Resp SpO2   01/17/21 1340 136/84 -- -- 128 26 92 %   01/17/21 1147 -- -- -- -- -- 99 %   01/17/21 1105 107/77 98.2 °F (36.8 °C) Temporal 121 28 --            Intake/Output Summary (Last 24 hours) at 1/17/2021 1421  Last data filed at 1/17/2021 4128  Gross per 24 hour   Intake 3155 ml   Output 50 ml   Net 3105 ml       Physical Exam:    General appearance:  No apparent distress, appears stated age, minimally responsive, no distress, on vapotherm. Ill-appearing. HEENT:  Normal cephalic, atraumatic without obvious deformity. Pupils equal, round  Conjunctivae/corneas clear. Neck: Supple, with full range of motion. No jugular venous distention. Trachea midline. Respiratory:  + accessory muscle use, tachypnea, +Rhonchi  Cardiovascular:  Regular rhythm, tachy rate  Abdomen: Soft, non-tender, non-distended with normal bowel sounds. Musculoskeletal:  No clubbing, cyanosis. + Generalized dependent edema, weeping from extremities. Skin: Skin color, texture, turgor normal.   Neuro. -Minimally responsive. Able to obey simple commands.     Scheduled Meds:   dexamethasone  6 mg Intravenous Q6H    busPIRone  15 mg Oral BID    levetiracetam  500 mg Intravenous Q12H    Venelex   Topical BID    amiodarone  200 mg Oral Daily    buPROPion  300 mg Oral QAM    ferrous sulfate  650 mg Oral Daily with breakfast    tamsulosin  0.4 mg Oral Nightly    cefepime  1 g Intravenous Q12H    sodium chloride flush  10 mL Intravenous 2 times per day    pantoprazole  40 mg Intravenous Q12H    And    sodium chloride (PF)  10 mL Intravenous Q12H       Continuous Infusions:   sodium chloride      norepinephrine Stopped (01/17/21 0440)       PRN Meds:  sodium chloride, albuterol-ipratropium, ipratropium-albuterol, albuterol sulfate HFA, sodium chloride flush, promethazine **OR** ondansetron, acetaminophen **OR** acetaminophen      Data:  CBC:   Recent Labs     01/15/21  0610 01/15/21  0610 01/16/21  0519 01/16/21  0519 01/17/21  0240 01/17/21  0615 01/17/21  1135   WBC 9.4  --  11.1*  --   --  6.2  --    HGB 11.1*   < > 8.7*   < > 8.0* 8.2* 8.9*   HCT 33.8*   < > 26.3*   < > 24.0* 25.0* 27.1*   MCV 92.6  --  91.2  --   --  92.7  --      --  96*  --   --  59*  --     < > = values in this interval not displayed. BMP:   Recent Labs     01/15/21  1320 01/16/21  0519 01/17/21  0615    135* 139   K 4.5 5.1 4.8  4.8    103 104   CO2 17* 21 22   PHOS 4.8 5.1* 4.9   BUN 80* 92* 89*   CREATININE 2.5* 2.8* 2.2*     LIVER PROFILE:   No results for input(s): AST, ALT, LIPASE, BILIDIR, BILITOT, ALKPHOS in the last 72 hours. Invalid input(s): AMYLASE,  ALB  PT/INR:   No results for input(s): PROTIME, INR in the last 72 hours. CULTURES  COVID: detected     RADIOLOGY  XR CHEST 1 VIEW   Final Result   Right jugular central line projects in normal position. No pneumothorax. Progressive bibasilar airspace disease with pleural effusions. Most likely   congestive failure/pulmonary edema/atelectasis. Stable cardiomegaly. IR FLUORO GUIDED CVA DEVICE PLMT/REPLACE/REMOVAL   Final Result   Successful ultrasound and fluoroscopy guided non-tunneled right internal   jugular catheter placement. CT ABDOMEN PELVIS WO CONTRAST   Final Result   Fluid-filled stomach and small bowel without definite obstruction. Gastroenteritis may be present. Cirrhotic appearance of the liver without   splenomegaly. Small amount of perihepatic ascites. Small pelvic ascites. Small left and very small right pleural effusions with moderate bibasilar   pulmonary opacities either due to atelectasis or pneumonia. Moderate   anasarca. XR CHEST PORTABLE   Final Result   1. Cardiomegaly   2. Bibasilar hypoaeration   3.  Linear opacities right parahilar region could represent scarring,   atelectasis or pneumonia             Assessment/Plan:    Septic Shock  Aspiration PNA  COPD AE  Acute on Chronic Hypoxic Respiratory Failure  COVID pneumonia  - wears 2L at baseline, had an aspiration event in ED and O2 requirements increased - pt was placed on vapotherm; no intubation   - initial XR showed linear opacities in right parahilar region and CT abdomen showed small left and very small right pleural effusions with moderate bibasilar opacities  - Admitted to ICU, tele, droplet + precautions, SLP eval  - supp O2, wean as tolerated, COVID PCR positive  - was on pressors. Now off.   - Vanc and Cefepime D#3,  PRN albuterol   started on Remdisivir - will d/c given low GFR  - Critical care consulted    GI Bleed - with coffee ground emesis  Iron Deficiency Anemia  History of cirrhosis  - Hgb on arrival: 12.3, repeat 9.6; occult stool positive  - s/p 2 units PRBCs  - checked iron studies, H&H q6hr  - NPO, IVF, IV PPI, octreotide, ferrous sulfate  - GI consulted - hold off on EGD until pt is more medically stable  - Hgb now at 8.9    CORA  Hyperkalemia- resolved  Non-anion gap metabolic acidosis- resolved  - Cr 2.3 on arrival, K 5.3  - IVF, on pressors; hold BP meds as below  - Nephro consult, daily renal panel  Started on a bicarb drip  Not a good candidate for hemodialysis.     Elevated Troponin  - 0.03  - trend, tele  - 0.02 > 0.03    CAD  PAF  Hx of Systolic CHF - now with recovered EF  - cardiologist - Dr. Carley Banegas  - see echo from [facility] in 12/2019  - monitor I&Os  - cont Amiodarone, hold Coreg, Lasix, Imdur, Aldactone, Cozaar and ASA    HTN  - BPs currently low, pt was on pressors  - holding Doxazosin, Lasix, Cozaar, Imdur, Coreg, Aldactone    GERD  - on PPI IV as above    Seizure DO  Hx of Status Epilepticus  - seizure precautions  - cont Keppra    Depression  Anxiety  - cont Buspar    Hx of Urinary Retention  - cont Flomax    Pt is a Limited code, meds only.    No intubation    DVT Prophylaxis: SCDs  Diet: Diet NPO Effective Now Exceptions are: Ice Chips  Code Status: Limited    Raisa Hallman FNP-C  1/17/2021

## 2021-01-17 NOTE — PROGRESS NOTES
Pulmonary & Critical Care Medicine ICU Progress Note    CC: Respiratory failure    Events of Last 24 hours: Tolerating Vapotherm, COVID +  Levophed off    Invasive Lines: PICC    MV:  D     / / /FiO2 : (S) 65 %(decreased to 60)  Recent Labs     01/15/21  0445 01/15/21  1413   PHART 7.268* 7.327*   DGI1CEU 44.7 36.1   PO2ART 46.6* 69.2*       IV:   sodium chloride      sodium bicarbonate infusion 100 mL/hr at 01/16/21 2202    norepinephrine Stopped (01/17/21 0440)    octreotide (SANDOSTATIN) infusion 25 mcg/hr (01/17/21 4021)       Vitals:  /78   Pulse 87   Temp 97 °F (36.1 °C) (Temporal)   Resp 20   Ht 5' 6\" (1.676 m)   Wt 162 lb (73.5 kg)   SpO2 94%   BMI 26.15 kg/m²   on Vapotherm 65%/30lpm    Intake/Output Summary (Last 24 hours) at 1/17/2021 0745  Last data filed at 1/17/2021 1243  Gross per 24 hour   Intake 2919 ml   Output 200 ml   Net 2719 ml     EXAM:  Constitutional: ill appearing. In distress. Eyes: PERRL. No sclera icterus. ENT: Normal Nose. Normal Tongue. Neck:  Trachea is midline. No thyroid tenderness. Respiratory: + accessory muscle usage. No wheezes. No rales. + Rhonchi. Cardiovascular: Normal S1S2. No murmur. No digit cyanosis. No digit clubbing. No LE edema. GI: Non-tender. Non-distended. Normal bowel sounds. No masses. No umbilical hernia. Skin: No rash on the exposed extremities. No Nodules on exposed extremities. Neuro: Drowsy but awakens to voice. ONly oriented to self. Moves all extremities. Psych: No agitation, no anxiety.     Scheduled Meds:   dexamethasone  6 mg Intravenous Q6H    busPIRone  15 mg Oral BID    levetiracetam  500 mg Intravenous Q12H    Venelex   Topical BID    amiodarone  200 mg Oral Daily    buPROPion  300 mg Oral QAM    ferrous sulfate  650 mg Oral Daily with breakfast    tamsulosin  0.4 mg Oral Nightly    cefepime  1 g Intravenous Q12H    sodium chloride flush  10 mL Intravenous 2 times per day    pantoprazole  40 mg Intravenous Q12H    And    sodium chloride (PF)  10 mL Intravenous Q12H     PRN Meds:  sodium chloride, albuterol-ipratropium, ipratropium-albuterol, albuterol sulfate HFA, sodium chloride flush, promethazine **OR** ondansetron, acetaminophen **OR** acetaminophen    Results:  CBC:   Recent Labs     01/14/21  1850 01/14/21  1850 01/15/21  0610 01/15/21  0610 01/16/21  0519 01/16/21  2044 01/17/21  0240 01/17/21  0615   WBC 5.5  --  9.4  --  11.1*  --   --  6.2   HGB 9.6*   < > 11.1*   < > 8.7* 8.3* 8.0* 8.2*   HCT 29.2*   < > 33.8*   < > 26.3* 25.2* 24.0* 25.0*   MCV 94.5  --  92.6  --  91.2  --   --  92.7     --  156  --  96*  --   --   --     < > = values in this interval not displayed. BMP:   Recent Labs     01/15/21  1320 01/16/21  0519 01/17/21  0615    135* 139   K 4.5 5.1 4.8    103 104   CO2 17* 21 22   PHOS 4.8 5.1* 4.9   BUN 80* 92* 89*   CREATININE 2.5* 2.8* 2.2*     LIVER PROFILE:   Recent Labs     01/14/21  1210   AST 22   ALT 13   LIPASE 6.0*   BILITOT 0.6   ALKPHOS 147*     1/14/21 COVID PCR +    Chest imaging was reviewed by me and showed CT ABD  Small bilateral pleural effusions with moderate opacity present   in each lower lobe most likely due to atelectasis although pneumonia is   possible.  No abnormal pulmonary vascular congestion.  Normal size of the   heart.      Fluid-filled stomach and small bowel without definite obstruction. Gastroenteritis may be present.  Cirrhotic appearance of the liver without   splenomegaly.  Small amount of perihepatic ascites.  Small pelvic ascites.    Small left and very small right pleural effusions with moderate bibasilar   pulmonary opacities either due to atelectasis or pneumonia.  Moderate   anasarca.       1/16/21 CXR:   Right jugular central line projects in normal position.       No pneumothorax.       Progressive bibasilar airspace disease with pleural effusions.  Most likely   congestive failure/pulmonary edema/atelectasis.       Stable cardiomegaly. ASSESSMENT:  · Acute hypoxic respiratory failure  · COVID 19 pneumonia  · Aspiration pneumonia  · CORA  · Metabolic acidosis  · GI Bleed  · Acute metabolic encehalopathy  · Cancer, metastatic   · PAF  · COPD     PLAN:  · Droplet +  Precautions  · Vapotherm  · Decadron D#3  · Low GFR, no Remdesivir, had 1 dose  · S/p CCP  · 1/2 NS with bicarb at 75/hr  · Nephrology following  · unstable for EGD  · PPI BID  · Stop Octreotide gtt  · CEF/Vanc D#3  · Serial Hgb  · Levophed to maintain MAP > 65  · Combivent  · SCD  · Total critical care time caring for this patient with life threatening, unstable organ failure, including direct patient contact, management of life support systems, review of data including imaging and labs, discussions with other team members and physicians at least 31 minutes so far today, excluding procedures.

## 2021-01-17 NOTE — PROGRESS NOTES
Shift assessment complete- see flowsheets. Pt has eyes opens but not responding verbally. VSS  Respirations are easy, even and unlabored. Pt is on vapotherm at 30LPM and 60%. CVC WDL. NA bicarb 75 mEq infusing at 75mL/hr. Plan of care and goals reviewed. Call light within reach. Bed in lowest position with wheels locked. No needs expressed at this time. Will continue to monitor.

## 2021-01-18 NOTE — PROGRESS NOTES
01/18/21 0841   NIV Type   Equipment Type v60   Mode Bilevel   Mask Type Full face mask   Mask Size Medium   Settings/Measurements   IPAP 16 cmH20   CPAP/EPAP 8 cmH2O   Rate Ordered 14   Resp (!) 33   Insp Rise Time (%) 3 %   FiO2  100 %   I Time/ I Time % 0.9 s   Vt Exhaled 501 mL   Minute Volume 15.9 Liters   Mask Leak (lpm) 11 lpm   Comfort Level Good   Using Accessory Muscles No   SpO2 99   Breath Sounds   Right Upper Lobe Diminished   Right Middle Lobe Diminished   Right Lower Lobe Diminished   Left Upper Lobe Diminished   Left Lower Lobe Diminished   Patient Observation   Observations 02 down to 80%   Alarm Settings   Alarms On Y   Press Low Alarm 5 cmH2O   High Pressure Alarm 25 cmH2O   Delay Alarm 20 sec(s)   Resp Rate Low Alarm 14   High Respiratory Rate 45 br/min

## 2021-01-18 NOTE — PROGRESS NOTES
Pulmonary & Critical Care Medicine ICU Progress Note    CC: Respiratory failure    Events of Last 24 hours:   Lethargic  Non communicative  100% FiO2  ABG showed hypercapnia placed on BiPAP    Invasive Lines: PICC         / / /FiO2 : 100 %  Recent Labs     01/15/21  1413   PHART 7.327*   RYM2MLY 36.1   PO2ART 69.2*           Vitals:  BP 89/62   Pulse 113   Temp 98 °F (36.7 °C) (Temporal)   Resp (!) 33   Ht 5' 6\" (1.676 m)   Wt 162 lb (73.5 kg)   SpO2 95%   BMI 26.15 kg/m²   on 100% FiO2    Intake/Output Summary (Last 24 hours) at 1/18/2021 0723  Last data filed at 1/17/2021 1652  Gross per 24 hour   Intake 601 ml   Output 275 ml   Net 326 ml     EXAM:  Constitutional: ill appearing. In distress. Eyes: PERRL. No sclera icterus. ENT: Normal Nose. Normal Tongue. Neck:  Trachea is midline. No thyroid tenderness. Respiratory: + accessory muscle usage. No wheezes. + Rales. Few rhonchi. Cardiovascular: Normal S1S2. No murmur. No digit cyanosis. No digit clubbing. No LE edema. GI: Non-tender. Non-distended. Normal bowel sounds. No masses. No umbilical hernia. Skin: No rash on the exposed extremities. No Nodules on exposed extremities. Neuro: Lethargic. Responsive to painful stimuli. Did not follow commands. Psych: No agitation, no anxiety.     Scheduled Meds:   dexamethasone  6 mg Intravenous Q6H    busPIRone  15 mg Oral BID    levetiracetam  500 mg Intravenous Q12H    Venelex   Topical BID    amiodarone  200 mg Oral Daily    buPROPion  300 mg Oral QAM    ferrous sulfate  650 mg Oral Daily with breakfast    tamsulosin  0.4 mg Oral Nightly    cefepime  1 g Intravenous Q12H    sodium chloride flush  10 mL Intravenous 2 times per day    pantoprazole  40 mg Intravenous Q12H    And    sodium chloride (PF)  10 mL Intravenous Q12H     PRN Meds:  sodium chloride, albuterol-ipratropium, ipratropium-albuterol, albuterol sulfate HFA, sodium chloride flush, promethazine **OR** ondansetron, acetaminophen **OR** acetaminophen    Results:  CBC:   Recent Labs     01/16/21  0519 01/16/21  0519 01/17/21  0615 01/17/21  0615 01/17/21  1810 01/18/21  0000 01/18/21  0600   WBC 11.1*  --  6.2  --   --   --   --    HGB 8.7*   < > 8.2*   < > 9.6* 10.1* 9.8*   HCT 26.3*   < > 25.0*   < > 29.7* 31.5* 31.0*   MCV 91.2  --  92.7  --   --   --   --    PLT 96*  --  59*  --   --   --   --     < > = values in this interval not displayed. BMP:   Recent Labs     01/16/21  0519 01/17/21  0615 01/18/21  0600   * 139 137   K 5.1 4.8  4.8 5.3*    104 103   CO2 21 22 24   PHOS 5.1* 4.9 6.8*   BUN 92* 89* 101*   CREATININE 2.8* 2.2* 2.5*     LIVER PROFILE:   No results for input(s): AST, ALT, LIPASE, BILIDIR, BILITOT, ALKPHOS in the last 72 hours. Invalid input(s): AMYLASE,  ALB       Data:  1/14/21 COVID PCR +    Chest x-ray 1/18 imaging reviewed by me and showed  Perihilar and bibasilar airspace disease      ASSESSMENT:  · Acute hypoxemic hypercapnic respiratory failure-worsening  · COVID 19 pneumonia  · Aspiration pneumonia  · CORA and metabolic acidosis  · Acute encephalopathy/metabolic encephalopathy  · GI Bleed  · Acute metabolic encehalopathy  · Cancer, metastatic   · PAF  · COPD       PLAN:  · Droplet plus isolation (surgical mask, eye protection, gown, glove)  · BiPAP 22/8 repeat ABG in 2 hours  · Lasix 40 IV x1  · Decadron D#4  · Vancomycin/cefepime day #4  · Low GFR, no Remdesivir, had 1 dose  · S/p CCP  · Fluid management per nephrology  · Unstable for EGD  · PPI BID  · Off octreotide gtt  · CEF/Vanc D#3  · H&H every 6  · Levophed to maintain MAP > 65  · Combivent  · SCD  · Limited code with no intubation, chest compression, or shocking  · Discussed with internal medicine  · D/W GUY Morris [phone] and updated her on the rapidly deteriorating status of Mr. José Antonio Contreras and likelihood of death. Offered her to come and see the patient.                 Total critical care time caring for this patient with life threatening, unstable organ failure, including direct patient contact, management of life support systems, review of data including imaging and labs, discussions with other team members and physicians at least 34 minutes so far today, excluding procedures.

## 2021-01-18 NOTE — PROGRESS NOTES
Speech Language Pathology  Attempt Note    SLP attempted to see pt for bedside swallow evaluation this date. SLP spoke with RN on unit re: if pt was appropriate to be seen. RN reported to SLP that pt is not alert, not following commands, and not responsive. Thus, pt is not appropriate for bedside swallow evaluation. SLP to re-attempt when appropriate.     Thank you,  Gilman Saint MA [address] [place] [address]  Speech Language Pathologist

## 2021-01-18 NOTE — PROGRESS NOTES
Shift assessment complete, see flowsheet. Pt resting in bed, opens eyes to voice but no other response. Unable to follow verbal commands. Pt on BiPAP 16/8 100%, SpO2 85-93%. At beginning of shift patient had pulled apart BiPAP, SpO2 dropped to 46%, slow recovery to 88%. Respiratory called. Call light in reach, will monitor.

## 2021-01-18 NOTE — PROGRESS NOTES
01/17/21 2259   NIV Type   Equipment Type v60   Mode Bilevel   Mask Type Full face mask   Mask Size Medium   Settings/Measurements   IPAP 16 cmH20   CPAP/EPAP 8 cmH2O   Rate Ordered 14   Resp 29   FiO2  100 %   Vt Exhaled 561 mL   Minute Volume 13 Liters   Mask Leak (lpm) 29 lpm   Comfort Level Good   Using Accessory Muscles No   SpO2 91

## 2021-01-18 NOTE — CONSULTS
[email or web address]  Consult Note       NAME:  Claudette [facility] RECORD NUMBER:  [id number]  AGE: 81 y.o. GENDER: male  : 1935  TODAY'S DATE:  2021    Subjective pt is nonverbal, eyes open   Reason for WOCN Evaluation and Assessment: sacrum, penis      Omayra Valles is a 81 y.o. male referred by:   [x] Physician  [] Nursing  [] Other:     Wound Identification:  Wound Type: pressure  Contributing Factors: chronic pressure, decreased mobility and shear force    Pt seen for wound care to the sacrum and penis. Pt admitted from [address] on . Areas to sacrum and penis POA per documentation. Pt in overflow area on standard mattress. Heels floated on pillows. 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    COPD (chronic obstructive pulmonary disease) ([facility]     COVID-19 2021    Hyperlipidemia     Hypertension     Shingles     2018       PAST SURGICAL HISTORY    Past Surgical History:   Procedure Laterality Date    CHOLECYSTECTOMY      HERNIA REPAIR Right     inguinal       FAMILY HISTORY    Family History   Problem Relation Age of Onset    Cancer Mother         colon    Cancer Father         melanoma, face       SOCIAL HISTORY    Social History     Tobacco Use    Smoking status: Former Smoker     Packs/day: 0.50     Types: Cigarettes    Smokeless tobacco: Never Used   Substance Use Topics    Alcohol use: Not Currently     Comment: not for years    Drug use: Never       ALLERGIES    No Known Allergies    MEDICATIONS    No current facility-administered medications on file prior to encounter.       Current Outpatient Medications on File Prior to Encounter   Medication Sig Dispense Refill    buPROPion (WELLBUTRIN XL) 300 MG extended release tablet Take 300 mg by mouth every morning      docusate sodium (COLACE) 100 MG capsule Take 100 mg by mouth 2 times daily as needed for Constipation      vitamin B-12 (CYANOCOBALAMIN) 500 MCG tablet Take 1,000 mcg by mouth daily      doxazosin (CARDURA) 4 MG tablet Take 4 mg by mouth daily      ferrous sulfate (IRON 325) 325 (65 Fe) MG tablet Take 650 mg by mouth daily (with breakfast)      polycarbophil (FIBERCON) 625 MG tablet Take 625 mg by mouth daily For diarrhea      furosemide (LASIX) 20 MG tablet Take 10 mg by mouth daily      gabapentin (NEURONTIN) 400 MG capsule Take 400 mg by mouth 3 times daily.  loperamide (IMODIUM) 2 MG capsule Take 2 mg by mouth 4 times daily as needed for Diarrhea      fluticasone-vilanterol (BREO ELLIPTA) 100-25 MCG/INH AEPB inhaler Inhale into the lungs daily      levETIRAcetam (KEPPRA) 500 MG tablet Take 1,000 mg by mouth 2 times daily      losartan (COZAAR) 25 MG tablet Take 25 mg by mouth daily      Multiple Vitamins-Minerals (THERAPEUTIC MULTIVITAMIN-MINERALS) tablet Take 1 tablet by mouth daily      potassium chloride (MICRO-K) 10 MEQ extended release capsule Take 10 mEq by mouth daily      promethazine (PHENERGAN) 25 MG tablet Take 25 mg by mouth every 6 hours as needed for Nausea      traMADol (ULTRAM) 50 MG tablet Take 50 mg by mouth every 6 hours as needed for Pain.       ondansetron (ZOFRAN) 4 MG tablet Take 4 mg by mouth every 8 hours as needed for Nausea or Vomiting      busPIRone (BUSPAR) 15 MG tablet Take 15 mg by mouth 2 times daily       isosorbide mononitrate (IMDUR) 30 MG extended release tablet Take 30 mg by mouth nightly      tamsulosin (FLOMAX) 0.4 MG capsule Take 0.4 mg by mouth nightly      amiodarone (PACERONE) 100 MG tablet Take 200 mg by mouth daily       magnesium oxide (MAG-OX) 400 MG tablet Take 400 mg by mouth daily  omeprazole (PRILOSEC) 20 MG delayed release capsule Take 20 mg by mouth daily      aspirin (ASPIRIN 81) 81 MG EC tablet Take 81 mg by mouth daily      acetaminophen (TYLENOL) 500 MG tablet Take 325 mg by mouth every 6 hours as needed for Pain       Umeclidinium Bromide (INCRUSE ELLIPTA) 62.5 MCG/INH AEPB Inhale into the lungs daily      albuterol sulfate  (90 Base) MCG/ACT inhaler Inhale 2 puffs into the lungs every 6 hours as needed for Wheezing      magnesium hydroxide (MILK OF MAGNESIA) 400 MG/5ML suspension Take by mouth daily as needed for Constipation         Objective    BP (!) 87/52   Pulse 122   Temp 98.2 °F (36.8 °C) (Temporal)   Resp (!) 33   Ht 5' 6\" (1.676 m)   Wt 162 lb (73.5 kg)   SpO2 98%   BMI 26.15 kg/m²     LABS:  WBC:    Lab Results   Component Value Date    WBC 6.2 01/17/2021     H/H:    Lab Results   Component Value Date    HGB 9.8 01/18/2021    HCT 31.0 01/18/2021     PTT:    Lab Results   Component Value Date    APTT 29.2 01/14/2021   [APTT}  PT/INR:    Lab Results   Component Value Date    PROTIME 13.9 01/14/2021    INR 1.20 01/14/2021     HgBA1c:  No results found for: LABA1C    Assessment   Ric Risk Score: Ric Scale Score: 12    Patient Active Problem List   Diagnosis Code    GI bleed K92.2    Acute respiratory failure with hypoxia ([facility]) J96.01    Pneumonia, unspecified organism J18.9    CORA (acute kidney injury) ([facility]) N17.9    Shock ([facility]) R57.9    Blood loss anemia D50.0       Nonblanchable redness over spine, stage 1 pressure injury    Sacrum:  62n90ef, deep tissue pressure injury, present on admission. 60% dark, nonblanchable purple with blood filled blister present, 40% redness, slow to sandra, some areas not blanching. Penis:  3.8x0.8x0.1cm linear, 80% dark purple, nonblanchable discoloration, 20% pink. Present on admission. Pappas cath present prior to admit? Plan Continue Venelex ointment. To sacrum, penis and spine.     Pt needs SYDNEY mattress or pump   Plan of Care:      Specialty Bed Required : Yes   [x] Low Air Loss   [] Pressure Redistribution  [] Fluid Immersion  [] Bariatric  [] Total Pressure Relief  [] Other:     Current Diet: Diet NPO Effective Now Exceptions are: Ice Chips  Dietician consult:  N/A    Discharge Plan:  Placement for patient upon discharge: skilled nursing    Patient appropriate for Outpatient [address]: N/A    Referrals:  [x]   [] [facility]  [] Supplies  [] Other    Patient/Caregiver Teaching:  Level of patient/caregiver understanding able to:   [] Indicates understanding       [] Needs reinforcement  [] Unsuccessful      [] Verbal Understanding  [] Demonstrated understanding       [] No evidence of learning  [] Refused teaching         [x] N/A       Electronically signed by Dewayne Lawton RN, CWOCN on 1/18/2021 at 10:53 AM

## 2021-01-18 NOTE — PROGRESS NOTES
01/18/21 0234   NIV Type   Equipment Type v60   Mode Bilevel   Mask Type Full face mask   Mask Size Medium   Settings/Measurements   IPAP 16 cmH20   CPAP/EPAP 8 cmH2O   Rate Ordered 14   Resp 27   FiO2  100 %   Vt Exhaled 547 mL   Minute Volume 13 Liters   Mask Leak (lpm) 37 lpm   Comfort Level Good   Using Accessory Muscles No   SpO2 92

## 2021-01-18 NOTE — PROGRESS NOTES
Report given to [facility] TRINA SUBRAMANIAN RN. No needs expressed. POC transferred at this time.   BiPap in place 16/8 100%

## 2021-01-18 NOTE — FLOWSHEET NOTE
01/18/21 0824   Vital Signs   Temp 98.2 °F (36.8 °C)   Temp Source Temporal   Pulse 122   Heart Rate Source Monitor   Resp 27   BP (!) 87/52   Level of Consciousness Responds to Voice (1)   MEWS Score 5   Oxygen Therapy   SpO2 98 %   O2 Device PAP (positive airway pressure)   FiO2  100 %     AM medications and assessment complete see MAR and flow sheets, pt resting in bed NPO non-verbal or responsive, pt reposition in bed, fresh sheets placed IJ infusing KVO. Martin Floss

## 2021-01-18 NOTE — PROGRESS NOTES
Progress Note    HISTORY     CC:   COVID19 pneumonia with septic shock            We are following for acute kidney injury         Subjective/     Chart and pertinent findings were reviewed. There were no acute events noted overnight. ROS: No fever or chills. Social: No family at bedside. EXAM       Objective/     Vitals:    01/18/21 0841 01/18/21 1105 01/18/21 1120 01/18/21 1258   BP:  (!) 93/59     Pulse:  97     Resp: (!) 33 (!) 34 (!) 34    Temp:  98.2 °F (36.8 °C)     TempSrc:  Temporal     SpO2:  92%  91%   Weight:       Height:         24HR INTAKE/OUTPUT:      Intake/Output Summary (Last 24 hours) at 1/18/2021 1337  Last data filed at 1/18/2021 0826  Gross per 24 hour   Intake 365 ml   Output 325 ml   Net 40 ml     The patient is COVID-19 positive; examination was deferred to avoid unnecessary exposure. MEDICAL DECISION MAKING       Data/  Recent Labs     01/16/21 0519 01/16/21  0519 01/17/21  0615 01/17/21  0615 01/17/21  1810 01/18/21  0000 01/18/21  0600   WBC 11.1*  --  6.2  --   --   --   --    HGB 8.7*   < > 8.2*   < > 9.6* 10.1* 9.8*   HCT 26.3*   < > 25.0*   < > 29.7* 31.5* 31.0*   MCV 91.2  --  92.7  --   --   --   --    PLT 96*  --  59*  --   --   --   --     < > = values in this interval not displayed. Recent Labs     01/16/21 0519 01/17/21  0615 01/18/21  0600   * 139 137   K 5.1 4.8  4.8 5.3*    104 103   CO2 21 22 24   GLUCOSE 126* 104* 118*   PHOS 5.1* 4.9 6.8*   BUN 92* 89* 101*   CREATININE 2.8* 2.2* 2.5*   LABGLOM 22* 29* 25*   GFRAA 26* 35* 30*       Assessment/        Acute Kidney Injury:  KDIGO stage 2  - Etiology:  Likely pre-renal to ATN. COVID documented in a number of renal pathologies with here with septic shock and hypotension in setting of home ARB use   - Clinical:  Electrolytes are ok, he is starting to make more urine with IV fluids and BP is now stable.   Conservative management is best      GI Bleed              GI following On Octreotide and PPI   Unstable for EGD      Respiratory Failure              + COVID              On Vapotherm   Decadron, convalescent plasma     Septic Shock:   COVID19 pneumonia   Off pressors today      Metabolic Acidosis              Due to kidney failure              Improving with HCO3 gtt    Plan/     Resume gentle IV fluids  Hemodynamic support  Would not be a good candidate for dialysis

## 2021-01-18 NOTE — PROGRESS NOTES
Progress Note    Admit Date:  1/14/2021    Subjective:  Mr. Raoul Larsen is non-responsive. Remains on BiPAP. O2 sats on arrival noted to be 49% on BiPAP on 100% O2. RN reported air leak with BiPAP. Pt did recover slowly to 95%. Objective:   Patient Vitals for the past 4 hrs:   BP Temp Temp src Pulse Resp SpO2   01/18/21 1120 -- -- -- -- (!) 34 --   01/18/21 1105 (!) 93/59 98.2 °F (36.8 °C) Temporal 97 (!) 34 92 %   01/18/21 0841 -- -- -- -- (!) 33 --   01/18/21 0824 (!) 87/52 98.2 °F (36.8 °C) Temporal 122 27 98 %            Intake/Output Summary (Last 24 hours) at 1/18/2021 1130  Last data filed at 1/18/2021 3705  Gross per 24 hour   Intake 365 ml   Output 325 ml   Net 40 ml       Physical Exam:  General appearance:  No apparent distress, appears stated age, minimally responsive, on BiPAP. Ill-appearing. HEENT:  Normal cephalic, atraumatic without obvious deformity. Conjunctivae/corneas clear. Neck: Supple, with full range of motion. Trachea midline. Respiratory:  + accessory muscle use, tachypnea, +diffuse coarse rhonchi  Cardiovascular:  Regular rhythm, tachy rate  Abdomen: Soft, non-tender, non-distended with normal bowel sounds. Musculoskeletal:  No clubbing, cyanosis. + Generalized dependent edema, weeping from extremities. Skin: Skin color, texture, turgor normal.   Neuro. Minimally responsive. I Shahid Rey have reviewed the chart on [facility] and personally interviewed and examined patient, reviewed the data (labs and imaging) and after discussion with my PA formulated the plan. Agree with note with the following edits. HPI:     I reviewed the patient's Past Medical History, Past Surgical History, Medications, and Allergies. Worsening hypoxemia, severe resp acidosis, remains unresponsive on bipap  H.h stable.  Borderline BP        General:  Elderly male unresponsive on bipap  No distress  Right IJ TLC noted  Mucous Membranes:  Pink , anicteric  Neck: No JVD, no carotid bruit, no thyromegaly  Chest:  Diminished jeff with scattered crackles   Cardiovascular:  RRR S1S2 heard, no murmurs or gallops  Abdomen:  Soft, undistended, non tender, no organomegaly, BS present  Extremities: worsening peripheral edema in both LE 3 + , worsening scrotal edema   Distal pulses well felt  Neurological : unresponsive                   Scheduled Meds:   dexamethasone  6 mg Intravenous Q6H    busPIRone  15 mg Oral BID    levetiracetam  500 mg Intravenous Q12H    Venelex   Topical BID    amiodarone  200 mg Oral Daily    buPROPion  300 mg Oral QAM    ferrous sulfate  650 mg Oral Daily with breakfast    tamsulosin  0.4 mg Oral Nightly    cefepime  1 g Intravenous Q12H    sodium chloride flush  10 mL Intravenous 2 times per day    pantoprazole  40 mg Intravenous Q12H     PRN Meds:  sodium chloride, albuterol-ipratropium, ipratropium-albuterol, albuterol sulfate HFA, sodium chloride flush, promethazine **OR** ondansetron, acetaminophen **OR** acetaminophen      Data:  CBC:   Recent Labs     01/16/21  0519 01/16/21  0519 01/17/21  0615 01/17/21  0615 01/17/21  1810 01/18/21  0000 01/18/21  0600   WBC 11.1*  --  6.2  --   --   --   --    HGB 8.7*   < > 8.2*   < > 9.6* 10.1* 9.8*   HCT 26.3*   < > 25.0*   < > 29.7* 31.5* 31.0*   MCV 91.2  --  92.7  --   --   --   --    PLT 96*  --  59*  --   --   --   --     < > = values in this interval not displayed. BMP:   Recent Labs     01/16/21 0519 01/17/21  0615 01/18/21  0600   * 139 137   K 5.1 4.8  4.8 5.3*    104 103   CO2 21 22 24   PHOS 5.1* 4.9 6.8*   BUN 92* 89* 101*   CREATININE 2.8* 2.2* 2.5*     CULTURES  COVID: detected     RADIOLOGY  XR CHEST 1 VIEW   Final Result   Right jugular central line projects in normal position. No pneumothorax. Progressive bibasilar airspace disease with pleural effusions. Most likely   congestive failure/pulmonary edema/atelectasis. Stable cardiomegaly.          IR FLUORO GUIDED CVA DEVICE PLMT/REPLACE/REMOVAL   Final Result   Successful ultrasound and fluoroscopy guided non-tunneled right internal   jugular catheter placement. CT ABDOMEN PELVIS WO CONTRAST   Final Result   Fluid-filled stomach and small bowel without definite obstruction. Gastroenteritis may be present. Cirrhotic appearance of the liver without   splenomegaly. Small amount of perihepatic ascites. Small pelvic ascites. Small left and very small right pleural effusions with moderate bibasilar   pulmonary opacities either due to atelectasis or pneumonia. Moderate   anasarca. XR CHEST PORTABLE   Final Result   1. Cardiomegaly   2. Bibasilar hypoaeration   3. Linear opacities right parahilar region could represent scarring,   atelectasis or pneumonia             Assessment/Plan:    Acute on Chronic Hypoxic Respiratory Failure    Sec to copd , CHF and COVID pneumonia  Cannot rule out aspiration pneumonia    - wears 2L at baseline, had an aspiration event in ED and O2 requirements increased - pt was placed on vapotherm; no intubation per code status    - initial XR showed linear opacities in right parahilar region and CT abdomen showed small left and very small right pleural effusions with moderate bibasilar opacities  - Admitted to ICU, tele, droplet + precautions, SLP eval  -  COVID PCR positive-  started on Remdisivir - d/c'd  given low GFR  - continue decadron   -  Worsening hypoxemia with acidosis , now on bipap   - pulmonary consulted , continue vanc and cefepime   - poor prognosis     Septic Shock   - likely with pneumonia- covid and aspirational  - was on pressors.   Now off.   - Vanc x 1 day , Cefepime D#5,  PRN albuterol        GI Bleed - with coffee ground emesis  Iron Deficiency Anemia  History of cirrhosis  - Hgb on arrival: 12.3, repeat 9.6; occult stool positive  - s/p 2 units PRBCs    - NPO, IVF > off, IV PPI, octreotide > off,  - GI consulted - hold off on EGD as pt unstable with worsening hypxia  -

## 2021-01-18 NOTE — PROGRESS NOTES
updated via telephone about Pt's condition including increasing oxygen demands.  placed new telephone orders with repeat back for STAT ABG and CXR. Respiratory therapist Myriam Chau notified of new order and x-ray notified.

## 2021-01-19 NOTE — PROGRESS NOTES
Speech Language Pathology- Attempt and D/C      Name: Tali Sotelo  : 1935  Medical Diagnosis: GI bleed [K92.2]  GI bleed [K92.2]     SLP attempting to see the patient for dysphagia evaluation. Per RN, the patient remains on Bi-PAP at this time. Order placed on  and this if 4th attempt. Per protocol, will need new orders. SLP to D/C order at this time. Please place new order once pt is off Bi-PAP and/or safe for PO trials.  Thank you,    Angelique Finn M.A., [address] [facility]  Speech-Language Pathologist  Phone: [phone]

## 2021-01-19 NOTE — PROGRESS NOTES
Patient taken off bipap and placed on nc 6lpm, patient resting comfortably at this time, unable to obtain sp02 but have been unable to all day

## 2021-01-19 NOTE — PLAN OF CARE
Problem: Infection - Central Venous Catheter-Associated Bloodstream Infection:  Goal: Will show no infection signs and symptoms  Description: Will show no infection signs and symptoms  Outcome: Ongoing     Problem: Falls - Risk of:  Goal: Will remain free from falls  Description: Will remain free from falls  Outcome: Ongoing  Goal: Absence of physical injury  Description: Absence of physical injury  Outcome: Ongoing     Problem: Skin Integrity:  Goal: Will show no infection signs and symptoms  Description: Will show no infection signs and symptoms  Outcome: Ongoing  Goal: Absence of new skin breakdown  Description: Absence of new skin breakdown  Outcome: Ongoing     Problem: Airway Clearance - Ineffective  Goal: Achieve or maintain patent airway  Outcome: Ongoing     Problem: Gas Exchange - Impaired  Goal: Absence of hypoxia  Outcome: Ongoing  Goal: Promote optimal lung function  Outcome: Ongoing     Problem: Breathing Pattern - Ineffective  Goal: Ability to achieve and maintain a regular respiratory rate  Outcome: Ongoing     Problem:  Body Temperature -  Risk of, Imbalanced  Goal: Ability to maintain a body temperature within defined limits  Outcome: Ongoing  Goal: Will regain or maintain usual level of consciousness  Outcome: Ongoing  Goal: Complications related to the disease process, condition or treatment will be avoided or minimized  Outcome: Ongoing     Problem: Isolation Precautions - Risk of Spread of Infection  Goal: Prevent transmission of infection  Outcome: Ongoing     Problem: Nutrition Deficits  Goal: Optimize nutritional status  Outcome: Ongoing     Problem: Risk for Fluid Volume Deficit  Goal: Maintain normal heart rhythm  Outcome: Ongoing  Goal: Maintain absence of muscle cramping  Outcome: Ongoing  Goal: Maintain normal serum potassium, sodium, calcium, phosphorus, and pH  Outcome: Ongoing     Problem: Loneliness or Risk for Loneliness  Goal: Demonstrate positive use of time alone when socialization is not possible  Outcome: Ongoing     Problem: Fatigue  Goal: Verbalize increase energy and improved vitality  Outcome: Ongoing     Problem: Patient Education: Go to Patient Education Activity  Goal: Patient/Family Education  Outcome: Ongoing

## 2021-01-19 NOTE — FLOWSHEET NOTE
Attempted to contact Pt's domestic partner listed in chart to offer support. No answer on phone. Left voicemail for her, informing her of our continued availability for support if needed.     Atanacio Mortimer   Associate       01/19/21 2626   Encounter Summary   Services provided to: Patient not available   Referral/Consult From: Roni   Continue Visiting   (1/19 EOL, left VM for contact in chart)

## 2021-01-19 NOTE — PROGRESS NOTES
Shift assessment complete. See doc flow. Nightly medications given see MAR. PO meds held according to MD notes. Patient in bed, bipap in place, high fowlers position, no distress noted. Call light and bedside table within easy reach. Will continue to monitor.

## 2021-01-19 NOTE — PROGRESS NOTES
Progress Note    Admit Date:  1/14/2021    Subjective:  Mr. Jonah Alarcon is non-responsive.  Remains on BiPAP since admission    Does not respond to verbal or painful stimuli   Withdraws to pain only     Has no family and neighbors are the guardian who are ok for comfort care       Objective:   Patient Vitals for the past 4 hrs:   BP Temp Temp src Pulse Resp   01/19/21 1114 -- -- -- -- 26   01/19/21 0908 99/67 97.2 °F (36.2 °C) Axillary 78 30            Intake/Output Summary (Last 24 hours) at 1/19/2021 1207  Last data filed at 1/18/2021 2230  Gross per 24 hour   Intake --   Output 75 ml   Net -75 ml       Physical Exam:      General:  Elderly male unresponsive on bipap  No distress  Right IJ TLC noted  Mucous Membranes:  Pink , anicteric  Neck: No JVD, no carotid bruit, no thyromegaly  Chest:  Diminished jeff with scattered crackles   Cardiovascular:  RRR S1S2 heard, no murmurs or gallops  Abdomen:  Soft, undistended, non tender, no organomegaly, BS present  Extremities: worsening peripheral edema in both LE 2 + scrotal edema   Distal pulses well felt  Neurological : unresponsive                   Scheduled Meds:   sodium zirconium cyclosilicate  10 g Oral Once    dexamethasone  6 mg Intravenous Daily    busPIRone  15 mg Oral BID    levetiracetam  500 mg Intravenous Q12H    Venelex   Topical BID    amiodarone  200 mg Oral Daily    buPROPion  300 mg Oral QAM    ferrous sulfate  650 mg Oral Daily with breakfast    tamsulosin  0.4 mg Oral Nightly    cefepime  1 g Intravenous Q12H    sodium chloride flush  10 mL Intravenous 2 times per day    pantoprazole  40 mg Intravenous Q12H     PRN Meds:  sodium chloride, albuterol-ipratropium, ipratropium-albuterol, albuterol sulfate HFA, sodium chloride flush, promethazine **OR** ondansetron, acetaminophen **OR** acetaminophen      Data:  CBC:   Recent Labs     01/17/21  0615 01/17/21  0615 01/17/21  1810 01/18/21  0000 01/18/21  0600   WBC 6.2  --   --   --   --    HGB 8.2* < > 9.6* 10.1* 9.8*   HCT 25.0*   < > 29.7* 31.5* 31.0*   MCV 92.7  --   --   --   --    PLT 59*  --   --   --   --     < > = values in this interval not displayed. BMP:   Recent Labs     01/17/21  0615 01/18/21  0600    137   K 4.8  4.8 5.3*    103   CO2 22 24   PHOS 4.9 6.8*   BUN 89* 101*   CREATININE 2.2* 2.5*     CULTURES  COVID: detected     RADIOLOGY  XR CHEST PORTABLE   Final Result   Perihilar and bibasilar opacification. Findings may be related to pulmonary   edema or possibly pneumonia. Bilateral pleural effusions. XR CHEST 1 VIEW   Final Result   Right jugular central line projects in normal position. No pneumothorax. Progressive bibasilar airspace disease with pleural effusions. Most likely   congestive failure/pulmonary edema/atelectasis. Stable cardiomegaly. IR FLUORO GUIDED CVA DEVICE PLMT/REPLACE/REMOVAL   Final Result   Successful ultrasound and fluoroscopy guided non-tunneled right internal   jugular catheter placement. CT ABDOMEN PELVIS WO CONTRAST   Final Result   Fluid-filled stomach and small bowel without definite obstruction. Gastroenteritis may be present. Cirrhotic appearance of the liver without   splenomegaly. Small amount of perihepatic ascites. Small pelvic ascites. Small left and very small right pleural effusions with moderate bibasilar   pulmonary opacities either due to atelectasis or pneumonia. Moderate   anasarca. XR CHEST PORTABLE   Final Result   1. Cardiomegaly   2. Bibasilar hypoaeration   3.  Linear opacities right parahilar region could represent scarring,   atelectasis or pneumonia             Assessment/Plan:    Acute on Chronic Hypoxic Respiratory Failure    Sec to copd , CHF and COVID pneumonia  Cannot rule out aspiration pneumonia    - wears 2L at baseline, had an aspiration event in ED and O2 requirements increased - pt was placed on vapotherm; no intubation per code status    - initial XR showed linear opacities in right parahilar region and CT abdomen showed small left and very small right pleural effusions with moderate bibasilar opacities  - Admitted to ICU, tele, droplet + precautions, SLP eval  -  COVID PCR positive-  started on Remdisivir - d/c'd  given low GFR  - continue decadron   -  Worsening hypoxemia with acidosis , now on bipap   - pulmonary consulted , continue vanc and cefepime   - poor prognosis aware by POA  - for hospice consult today    Septic Shock   - likely with pneumonia- covid and aspirational  - was on pressors. Now off. -  Cefepime D#6,  PRN albuterol        GI Bleed - with coffee ground emesis  Iron Deficiency Anemia  History of cirrhosis  - Hgb on arrival: 12.3, repeat 9.6; occult stool positive  - s/p 2 units PRBCs    - NPO, IVF > off, IV PPI, octreotide > off,  - GI consulted - hold off on EGD as pt unstable with worsening hypxia  - Hgb now at  > 9.8   - no recurrent bleeding noted     CORA  Hyperkalemia  Non-anion gap metabolic acidosis- resolved  - Cr 2.3 on arrival  - IVF, on pressors; hold BP meds as below  - Nephro consulted, daily renal panel  - Started on a bicarb drip > off  - Not a candidate for hemodialysis.   - Cr 2.5 today, K 5.3     Elevated Troponin  - 0.03  - trend, tele  - 0.02 > 0.03    CAD  PAF  Hx of Systolic CHF - now with recovered EF  - cardiologist - Dr. 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

## 2021-01-19 NOTE — PROGRESS NOTES
01/19/21 1530   NIV Type   Equipment Type v60   Mode Bilevel   Mask Type Full face mask   Mask Size Medium   Settings/Measurements   IPAP 22 cmH20   CPAP/EPAP 8 cmH2O   Rate Ordered 14   Resp 25   Insp Rise Time (%) 3 %   FiO2  100 %   I Time/ I Time % 0.9 s   Vt Exhaled 671 mL   Minute Volume 15.8 Liters   Mask Leak (lpm) 49 lpm   Comfort Level Good   Using Accessory Muscles No   Breath Sounds   Right Upper Lobe Diminished   Right Middle Lobe Diminished   Right Lower Lobe Diminished   Left Upper Lobe Diminished   Left Lower Lobe Diminished   Alarm Settings   Alarms On Y   Press Low Alarm 5 cmH2O   High Pressure Alarm 25 cmH2O   Delay Alarm 20 sec(s)   Resp Rate Low Alarm 14   High Respiratory Rate 45 br/min

## 2021-01-19 NOTE — PROGRESS NOTES
01/18/21 2005   NIV Type   $NIV $Daily Charge   Equipment Type v60   Mode Bilevel   Mask Type Full face mask   Mask Size Medium   Settings/Measurements   IPAP 16 cmH20   CPAP/EPAP 8 cmH2O   Rate Ordered 14   Resp (!) 31   FiO2  100 %   I Time/ I Time % 0.9 s   Vt Exhaled 821 mL   Minute Volume 15 Liters   Mask Leak (lpm) 2 lpm   Comfort Level Good   Using Accessory Muscles No   SpO2 92   Alarm Settings   Alarms On Y   Press Low Alarm 5 cmH2O   High Pressure Alarm 25 cmH2O   Delay Alarm 20 sec(s)   Resp Rate Low Alarm 14   High Respiratory Rate 45 br/min

## 2021-01-19 NOTE — CARE COORDINATION
[facility] here and met with pt and family and transferred to [facility] partner at [facility].

## 2021-01-19 NOTE — PROGRESS NOTES
01/19/21 1114   NIV Type   Skin Assessment Skin breakdown present (see comment/note)   Skin Protection for O2 Device Yes   Equipment Type v60   Mode Bilevel   Mask Type Full face mask   Mask Size Medium   Settings/Measurements   IPAP 22 cmH20   CPAP/EPAP 8 cmH2O   Rate Ordered 14   Resp 26   Insp Rise Time (%) 3 %   FiO2  100 %   I Time/ I Time % 0.9 s   Vt Exhaled 813 mL   Minute Volume 21 Liters   Mask Leak (lpm) 26 lpm   Comfort Level Good   Using Accessory Muscles No   Breath Sounds   Right Upper Lobe Diminished   Right Middle Lobe Diminished   Right Lower Lobe Diminished   Left Upper Lobe Diminished   Left Lower Lobe Diminished   Alarm Settings   Alarms On Y   Press Low Alarm 5 cmH2O   High Pressure Alarm 25 cmH2O   Delay Alarm 20 sec(s)   Resp Rate Low Alarm 14   High Respiratory Rate 45 br/min

## 2021-01-19 NOTE — CARE COORDINATION
Order noted for Hospice Consult. CM spoke with pt poa/friend, Luis Hawkins, who states that she would like for [address] [place] [address] to follow and partner with [facility] if appropriate. Referral called and faxed to Evelyn Keith with [address] [place] [address] at this time. 1245: Rec'd return call from Gume with [address] [place] [address] who states that she will be here to see pt shortly.

## 2021-01-19 NOTE — PROGRESS NOTES
01/19/21 0157   NIV Type   Equipment Type v60   Mode Bilevel   Mask Type Full face mask   Mask Size Medium   Settings/Measurements   IPAP 16 cmH20   CPAP/EPAP 8 cmH2O   Rate Ordered 14   Resp 30   FiO2  100 %   I Time/ I Time % 0.9 s   Vt Exhaled 741 mL   Minute Volume 14.2 Liters   Mask Leak (lpm) 5 lpm   Comfort Level Good   Using Accessory Muscles No   SpO2 90

## 2021-01-19 NOTE — PROGRESS NOTES
Pulmonary & Critical Care Medicine ICU Progress Note    CC: Respiratory failure    Events of Last 24 hours:   Lethargic on BiPAP  Non communicative  100% FiO2    Invasive Lines: PICC         / / /FiO2 : 100 %  Recent Labs     01/18/21  1245   PHART 7.172*   OJS9TFY 59.1*   PO2ART 99.7           Vitals:  BP (!) 95/59   Pulse 120   Temp 98.7 °F (37.1 °C) (Axillary)   Resp 30   Ht 5' 6\" (1.676 m)   Wt 162 lb (73.5 kg)   SpO2 97%   BMI 26.15 kg/m²   on 100% FiO2    Intake/Output Summary (Last 24 hours) at 1/19/2021 0743  Last data filed at 1/18/2021 2230  Gross per 24 hour   Intake --   Output 175 ml   Net -175 ml     EXAM:  Blood pressure 99/67, pulse 78, temperature 97.2 °F (36.2 °C), temperature source Axillary, resp. rate 26, height 5' 6\" (1.676 m), weight 162 lb (73.5 kg), SpO2 97 %.' on 100%  Gen: No distress. Ill-appearing cachectic  Eyes: PERRL. No sclera icterus. No conjunctival injection. ENT: No discharge. Pharynx clear. Neck: Trachea midline. No obvious mass. Resp: + Accessory muscle use. No crackles. No wheezes. Bilateral rhonchi. No dullness on percussion. Good air entry. CV: Regular rate. Regular rhythm. No murmur or rub. No edema. GI: Non-tender. Non-distended. No hernia. Skin: Warm and dry. No nodule on exposed extremities. Lymph: No cervical LAD. No supraclavicular LAD. M/S: No cyanosis. No joint deformity. No clubbing. Neuro: Lethargic. Responsive to painful stimuli.   Psych: No agitation      Scheduled Meds:   sodium zirconium cyclosilicate  10 g Oral Once    dexamethasone  6 mg Intravenous Daily    busPIRone  15 mg Oral BID    levetiracetam  500 mg Intravenous Q12H    Venelex   Topical BID    amiodarone  200 mg Oral Daily    buPROPion  300 mg Oral QAM    ferrous sulfate  650 mg Oral Daily with breakfast    tamsulosin  0.4 mg Oral Nightly    cefepime  1 g Intravenous Q12H    sodium chloride flush  10 mL Intravenous 2 times per day    pantoprazole  40 mg Intravenous Q12H     PRN Meds:  sodium chloride, albuterol-ipratropium, ipratropium-albuterol, albuterol sulfate HFA, sodium chloride flush, promethazine **OR** ondansetron, acetaminophen **OR** acetaminophen    Results:  CBC:   Recent Labs     01/17/21  0615 01/17/21  0615 01/17/21  1810 01/18/21  0000 01/18/21  0600   WBC 6.2  --   --   --   --    HGB 8.2*   < > 9.6* 10.1* 9.8*   HCT 25.0*   < > 29.7* 31.5* 31.0*   MCV 92.7  --   --   --   --    PLT 59*  --   --   --   --     < > = values in this interval not displayed. BMP:   Recent Labs     01/17/21  0615 01/18/21  0600    137   K 4.8  4.8 5.3*    103   CO2 22 24   PHOS 4.9 6.8*   BUN 89* 101*   CREATININE 2.2* 2.5*     LIVER PROFILE:   No results for input(s): AST, ALT, LIPASE, BILIDIR, BILITOT, ALKPHOS in the last 72 hours. Invalid input(s):   AMYLASE,  ALB       Data:  1/14/21 COVID PCR +    Chest x-ray 1/18 imaging reviewed by me and showed  Perihilar and bibasilar airspace disease      ASSESSMENT:  · Acute hypoxemic hypercapnic respiratory failure-worsening  · COVID 19 pneumonia  · Aspiration pneumonia  · Acute encephalopathy/metabolic encephalopathy  · GI Bleed  · Cancer, metastatic   · PAF  · COPD       PLAN:  · Droplet plus isolation (surgical mask, eye protection, gown, glove)  · HFNC and BiPAP   · Decadron D#5  · Vancomycin/cefepime day # 5  · Low GFR, no Remdesivir, had 1 dose  · S/p CCP  · Fluid management per nephrology  · Unstable for EGD  · PPI BID with  · SCD  · Limited code with no intubation, chest compression, or shocking  · Hospice has been consulted which I think is appropriate

## 2021-01-20 NOTE — PROGRESS NOTES
Patient found  in bed. Family called per notes in chart, will not be coming too see him. Release to  home per  Rai Guillen. Dr. Indiana Ba is aware. Patient not eligible for tissue donation per call made. Ref # [id number].

## 2021-02-10 NOTE — DISCHARGE SUMMARY
Name:  Opal Stein  Room:  8177/6034-60  MRN:    [id number]    IM Discharge Summary    Discharging Physician:  Francis Cunningham MD    Admit: 1/19/2021    Discharge:  1/20/2021    PCP      [address] [facility]    Diagnoses and hospital course  this Admission         [address]        HPI 81 y.o. male presents from [facility] with complaint of coffee ground in appearance emesis. Initially presented hypotensive, was stable on his chronic O2 requirement of 2 lpm NC O2 for COPD. During evaluation in the ER he aspirated and developed worsening hypoxemic respiratory failure with intubation recommended, patient declined after confiriming DNR cca status. Hypoxemia placed on  vapotherm. Radiology (Please Review Full Report for Details)       CULTURES  COVID: detected      RADIOLOGY  XR CHEST PORTABLE   Final Result   Perihilar and bibasilar opacification. Findings may be related to pulmonary   edema or possibly pneumonia. Bilateral pleural effusions.           XR CHEST 1 VIEW   Final Result   Right jugular central line projects in normal position.       No pneumothorax.       Progressive bibasilar airspace disease with pleural effusions. Most likely   congestive failure/pulmonary edema/atelectasis.       Stable cardiomegaly.           IR FLUORO GUIDED CVA DEVICE PLMT/REPLACE/REMOVAL   Final Result   Successful ultrasound and fluoroscopy guided non-tunneled right internal   jugular catheter placement.           CT ABDOMEN PELVIS WO CONTRAST   Final Result   Fluid-filled stomach and small bowel without definite obstruction. Gastroenteritis may be present. Cirrhotic appearance of the liver without   splenomegaly. Small amount of perihepatic ascites. Small pelvic ascites. Small left and very small right pleural effusions with moderate bibasilar   pulmonary opacities either due to atelectasis or pneumonia. Moderate   anasarca.           XR CHEST PORTABLE   Final Result   1. Cardiomegaly   2. Bibasilar hypoaeration   3. Linear opacities right parahilar region could represent scarring,   atelectasis or pneumonia                    Consults:    1.  Pulmonary             CBC:  Lab Results   Component Value Date    WBC 6.2 01/17/2021    HGB 9.8 01/18/2021    HCT 31.0 01/18/2021    MCV 92.7 01/17/2021    PLT 59 01/17/2021    NEUTOPHILPCT 91.0 01/17/2021    LYMPHOPCT 3.0 01/17/2021    MONOPCT 3.0 01/17/2021    BASOPCT 0.0 01/17/2021    NEUTROABS 5.8 01/17/2021    LYMPHSABS 0.2 01/17/2021    MONOSABS 0.2 01/17/2021    EOSABS 0.0 01/17/2021    BASOSABS 0.0 01/17/2021     BNP:   Lab Results   Component Value Date     01/18/2021    K 5.3 01/18/2021    K 4.8 01/17/2021    CO2 24 01/18/2021     01/18/2021    CREATININE 2.5 01/18/2021    CALCIUM 7.6 01/18/2021     Hospital course        Acute on Chronic Hypoxic Respiratory Failure  COVID PNEUMONIA   COPD EXACERBATION   POSSIBLE aspiration pneumonia     - wears 2L at baseline, had an aspiration event in ED and O2 requirements increased - pt was placed on vapotherm; no intubation per code status     - initial XR showed linear opacities in right parahilar region and CT abdomen showed small left and very small right pleural effusions with moderate bibasilar opacities  - Admitted to ICU, tele, droplet + precautions, SLP eval  -  COVID PCR positive-  started on Remdisivir - d/c'd  given low GFR  -  Added  decadron   -  Worsened hypoxemia with acidosis , later on bipap   - pulmonary consulted , added vanc and cefepime   - hospice consulted and pt passed away with comfort care      Other diagnosis     Septic Shock   GI Bleed   Iron Deficiency Anemia  History of cirrhosis  CORA  Hyperkalemia  Non-anion gap metabolic acidosis  Elevated Troponin  CAD  PAF  Hx of Systolic CHF  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

## 2021-02-11 NOTE — DISCHARGE SUMMARY
Name:  Mihir Sotelo  Room:  3755/8197-67  MRN:    [id number]    IM Discharge Summary    Discharging Physician:  Sherlyn Morris MD    Admit: 1/14/2021    Discharge:  1/19/2021    PCP      [address] [facility]    Diagnoses and hospital course  this Admission         [address]        HPI 81 y.o. male presents from [facility] with complaint of coffee ground in appearance emesis. Initially presented hypotensive, was stable on his chronic O2 requirement of 2 lpm NC O2 for COPD. During evaluation in the ER he aspirated and developed worsening hypoxemic respiratory failure with intubation recommended, patient declined after confiriming DNR cca status. Hypoxemia placed on  vapotherm. Radiology (Please Review Full Report for Details)       CULTURES  COVID: detected      RADIOLOGY  XR CHEST PORTABLE   Final Result   Perihilar and bibasilar opacification. Findings may be related to pulmonary   edema or possibly pneumonia. Bilateral pleural effusions.           XR CHEST 1 VIEW   Final Result   Right jugular central line projects in normal position.       No pneumothorax.       Progressive bibasilar airspace disease with pleural effusions. Most likely   congestive failure/pulmonary edema/atelectasis.       Stable cardiomegaly.           IR FLUORO GUIDED CVA DEVICE PLMT/REPLACE/REMOVAL   Final Result   Successful ultrasound and fluoroscopy guided non-tunneled right internal   jugular catheter placement.           CT ABDOMEN PELVIS WO CONTRAST   Final Result   Fluid-filled stomach and small bowel without definite obstruction. Gastroenteritis may be present. Cirrhotic appearance of the liver without   splenomegaly. Small amount of perihepatic ascites. Small pelvic ascites. Small left and very small right pleural effusions with moderate bibasilar   pulmonary opacities either due to atelectasis or pneumonia. Moderate   anasarca.           XR CHEST PORTABLE   Final Result   1. Cardiomegaly   2. Bibasilar hypoaeration   3. Linear opacities right parahilar region could represent scarring,   atelectasis or pneumonia                    Consults:    1.  Pulmonary             CBC:  Lab Results   Component Value Date    WBC 6.2 01/17/2021    HGB 9.8 01/18/2021    HCT 31.0 01/18/2021    MCV 92.7 01/17/2021    PLT 59 01/17/2021    NEUTOPHILPCT 91.0 01/17/2021    LYMPHOPCT 3.0 01/17/2021    MONOPCT 3.0 01/17/2021    BASOPCT 0.0 01/17/2021    NEUTROABS 5.8 01/17/2021    LYMPHSABS 0.2 01/17/2021    MONOSABS 0.2 01/17/2021    EOSABS 0.0 01/17/2021    BASOSABS 0.0 01/17/2021     BNP:   Lab Results   Component Value Date     01/18/2021    K 5.3 01/18/2021    K 4.8 01/17/2021    CO2 24 01/18/2021     01/18/2021    CREATININE 2.5 01/18/2021    CALCIUM 7.6 01/18/2021     Hospital course        Acute on Chronic Hypoxic Respiratory Failure  COVID PNEUMONIA   COPD EXACERBATION   POSSIBLE aspiration pneumonia     - wears 2L at baseline, had an aspiration event in ED and O2 requirements increased - pt was placed on vapotherm; no intubation per code status     - initial XR showed linear opacities in right parahilar region and CT abdomen showed small left and very small right pleural effusions with moderate bibasilar opacities  - Admitted to ICU, tele, droplet + precautions, SLP eval  -  COVID PCR positive-  started on Remdisivir - d/c'd  given low GFR  -  Added  decadron   -  Worsened hypoxemia with acidosis , later on bipap   - pulmonary consulted , added vanc and cefepime   - hospice consulted and pt passed away with comfort care      Other diagnosis     Septic Shock   GI Bleed   Iron Deficiency Anemia  History of cirrhosis  CORA  Hyperkalemia  Non-anion gap metabolic acidosis  Elevated Troponin  CAD  PAF  Hx of Systolic CHF  Seizure DO  Hx of Status Epilepticus            Carl Real MD 2/10/2021 7:20 PM

## 2021-02-11 NOTE — H&P
Admission [facility];  MRN: [id number] ;   Admit Date: 1/19/2021  4:06 PM   Current Date and Time: 2/10/2021 7:38 PM    PCP  --  Pili Davis         Cc - hospice care      HPI 81 y. o. male presents from [facility] with complaint of coffee ground in appearance emesis.  Initially presented hypotensive, was stable on his chronic O2 requirement of 2 lpm NC O2 for COPD. During evaluation in the ER he aspirated and developed worsening hypoxemic respiratory failure with intubation recommended, patient declined after confiriming DNR cca status. Hypoxemia placed on  vapotherm. Consulted hospice at [facility] request with grave prognosis         No Known Allergies    Past Medical History:   Diagnosis Date    A-fib ([facility] [address]     BPH (benign prostatic hyperplasia)     CAD (coronary artery disease)     Cancer (HCC)     skin, nose, ear, face    Cerebral artery occlusion with cerebral infarction (HCC)     tia's    COPD (chronic obstructive pulmonary disease) ([facility] [address]     COVID-19 01/14/2021    Hyperlipidemia     Hypertension     Shingles     2018      Past Surgical History:   Procedure Laterality Date    CHOLECYSTECTOMY      HERNIA REPAIR Right 1990    inguinal      No medications prior to admission.   No Known Allergies   Social History     Tobacco Use    Smoking status: Former Smoker     Packs/day: 0.50     Types: Cigarettes    Smokeless tobacco: Never Used   Substance Use Topics    Alcohol use: Not Currently     Comment: not for years      Family History   Problem Relation Age of Onset    Cancer Mother         colon    Cancer Father         melanoma, face          Review of systems    Unable to obtain     Objective:     VS:          Physical Exam:  Elderly male on bipap in resp distress  Obtunded and unresponsive  Diminished breath sounds jeff on bipap        LABS:  CBC:  Lab Results   Component Value Date    WBC 6.2 01/17/2021    HGB 9.8 01/18/2021    HCT 31.0 01/18/2021    MCV 92.7 01/17/2021    PLT 59 01/17/2021    NEUTOPHILPCT 91.0 01/17/2021    LYMPHOPCT 3.0 01/17/2021    MONOPCT 3.0 01/17/2021    BASOPCT 0.0 01/17/2021    NEUTROABS 5.8 01/17/2021    LYMPHSABS 0.2 01/17/2021    MONOSABS 0.2 01/17/2021    EOSABS 0.0 01/17/2021    BASOSABS 0.0 01/17/2021     BMP:   Lab Results   Component Value Date     01/18/2021    K 5.3 01/18/2021    K 4.8 01/17/2021    CO2 24 01/18/2021     01/18/2021    CREATININE 2.5 01/18/2021    CALCIUM 7.6 01/18/2021     Coagulation:   Lab Results   Component Value Date    INR 1.20 01/14/2021    APTT 29.2 01/14/2021     Cardiac markers:   Lab Results   Component Value Date    TROPONINI 0.03 01/15/2021     ABGs: No results found for: POCPH, POCPCO2, POCPO2, POCHCO3, POCTCO2, POCFIO2    Lab Results   Component Value Date    ALT 13 01/14/2021    AST 22 01/14/2021    ALKPHOS 147 (H) 01/14/2021    BILITOT 0.6 01/14/2021       Lab Results   Component Value Date    INR 1.20 (H) 01/14/2021    PROTIME 13.9 (H) 01/14/2021         CULTURES  COVID: detected      RADIOLOGY  XR CHEST PORTABLE   Final Result   Perihilar and bibasilar opacification.  Findings may be related to pulmonary   edema or possibly pneumonia.  Bilateral pleural effusions.           XR CHEST 1 VIEW   Final Result   Right jugular central line projects in normal position.       No pneumothorax.       Progressive bibasilar airspace disease with pleural effusions.  Most likely   congestive failure/pulmonary edema/atelectasis.       Stable cardiomegaly.           IR FLUORO GUIDED CVA DEVICE PLMT/REPLACE/REMOVAL   Final Result   Successful ultrasound and fluoroscopy guided non-tunneled right internal   jugular catheter placement.           CT ABDOMEN PELVIS WO CONTRAST   Final Result   Fluid-filled stomach and small bowel without definite obstruction. Gastroenteritis may be present.  Cirrhotic appearance of the liver without   splenomegaly.  Small amount of perihepatic ascites.  Small pelvic ascites.    Small left and

## 2022-06-03 NOTE — TELEPHONE ENCOUNTER
1450 Awake and oriented on arrival to PACU with Simple mask , HOB elevated , denies any pain or SOB pt able to say the letter E  1505 resting resp easy , O2 off , states he is freezing, pdero pablo phillips applied  1520 pt awakens easily to name , continues to deny pain or SOB   1525 meets criteria for discharge , transported to [facility] Pt can see me in March with a 3 month chest to follow up an abnormal chest CT.  Please obtain notes from his appt with oncology at [facility] 1/13/20

## 2023-02-24 NOTE — ED NOTES
Pt states he is unable to urinate @ this time, but will reattempt af ter IVFB is completed.  MD made aware     Stephanie Love RN  09/30/20 1949 S/p MRI  Neurology on board  Recent hx of MCA CVA with petechial hemorrhage w residual L sided weakness and dysarthria s/p mechanical thrombectomy  Pt w/ severe contrast allergy resulting anaphylaxis, no need to repeat per neuro  Recent echo on 2/16/23  CVA prophylaxis: In light of new MRI findings started on asa  Plan is to start NOAC over the weekend if stable repeat cth   Cont statin